# Patient Record
Sex: MALE | Race: WHITE | NOT HISPANIC OR LATINO | Employment: OTHER | URBAN - METROPOLITAN AREA
[De-identification: names, ages, dates, MRNs, and addresses within clinical notes are randomized per-mention and may not be internally consistent; named-entity substitution may affect disease eponyms.]

---

## 2017-01-23 ENCOUNTER — LAB VISIT (OUTPATIENT)
Dept: LAB | Facility: HOSPITAL | Age: 62
End: 2017-01-23
Payer: MEDICARE

## 2017-01-23 DIAGNOSIS — Z76.82 AWAITING ORGAN TRANSPLANT STATUS: ICD-10-CM

## 2017-01-23 PROCEDURE — 86829 HLA CLASS I/II ANTIBODY QUAL: CPT | Mod: 91,PO

## 2017-01-23 PROCEDURE — 86829 HLA CLASS I/II ANTIBODY QUAL: CPT | Mod: PO

## 2017-01-31 LAB — HPRA INTERPRETATION: NORMAL

## 2017-02-20 ENCOUNTER — LAB VISIT (OUTPATIENT)
Dept: LAB | Facility: HOSPITAL | Age: 62
End: 2017-02-20
Payer: MEDICARE

## 2017-02-20 DIAGNOSIS — Z76.82 AWAITING ORGAN TRANSPLANT STATUS: ICD-10-CM

## 2017-02-20 PROCEDURE — 86832 HLA CLASS I HIGH DEFIN QUAL: CPT | Mod: PO

## 2017-02-20 PROCEDURE — 86833 HLA CLASS II HIGH DEFIN QUAL: CPT | Mod: PO

## 2017-03-07 ENCOUNTER — HOSPITAL ENCOUNTER (OUTPATIENT)
Dept: RADIOLOGY | Facility: HOSPITAL | Age: 62
Discharge: HOME | End: 2017-03-07
Attending: INTERNAL MEDICINE | Admitting: NURSE PRACTITIONER
Payer: MEDICARE

## 2017-03-07 ENCOUNTER — HOSPITAL ENCOUNTER (OUTPATIENT)
Dept: RADIOLOGY | Facility: HOSPITAL | Age: 62
Discharge: HOME | End: 2017-03-07
Attending: INTERNAL MEDICINE
Payer: MEDICARE

## 2017-03-07 ENCOUNTER — OFFICE VISIT (OUTPATIENT)
Dept: TRANSPLANT | Facility: CLINIC | Age: 62
End: 2017-03-07
Payer: MEDICARE

## 2017-03-07 ENCOUNTER — CLINICAL SUPPORT (OUTPATIENT)
Dept: CARDIOLOGY | Facility: CLINIC | Age: 62
End: 2017-03-07
Payer: MEDICARE

## 2017-03-07 VITALS
RESPIRATION RATE: 17 BRPM | DIASTOLIC BLOOD PRESSURE: 96 MMHG | BODY MASS INDEX: 31.64 KG/M2 | HEIGHT: 69 IN | WEIGHT: 213.63 LBS | TEMPERATURE: 98 F | HEART RATE: 85 BPM | OXYGEN SATURATION: 95 % | SYSTOLIC BLOOD PRESSURE: 151 MMHG

## 2017-03-07 DIAGNOSIS — Z76.82 ORGAN TRANSPLANT CANDIDATE: ICD-10-CM

## 2017-03-07 DIAGNOSIS — N18.6 ESRD ON HEMODIALYSIS: Chronic | ICD-10-CM

## 2017-03-07 DIAGNOSIS — I25.10 CORONARY ARTERY DISEASE INVOLVING NATIVE CORONARY ARTERY OF NATIVE HEART WITHOUT ANGINA PECTORIS: Primary | Chronic | ICD-10-CM

## 2017-03-07 DIAGNOSIS — N28.1 RENAL CYST: ICD-10-CM

## 2017-03-07 DIAGNOSIS — N02.B9 IGA NEPHROPATHY: Chronic | ICD-10-CM

## 2017-03-07 DIAGNOSIS — Z76.82 AWAITING ORGAN TRANSPLANT STATUS: ICD-10-CM

## 2017-03-07 DIAGNOSIS — Z99.2 ESRD ON HEMODIALYSIS: Chronic | ICD-10-CM

## 2017-03-07 DIAGNOSIS — I15.0 RENOVASCULAR HYPERTENSION: ICD-10-CM

## 2017-03-07 DIAGNOSIS — G62.9 PERIPHERAL POLYNEUROPATHY: Chronic | ICD-10-CM

## 2017-03-07 LAB — DIASTOLIC DYSFUNCTION: NO

## 2017-03-07 PROCEDURE — 76770 US EXAM ABDO BACK WALL COMP: CPT | Mod: 26,GC,, | Performed by: RADIOLOGY

## 2017-03-07 PROCEDURE — 93018 CV STRESS TEST I&R ONLY: CPT | Mod: S$PBB,,, | Performed by: INTERNAL MEDICINE

## 2017-03-07 PROCEDURE — 99213 OFFICE O/P EST LOW 20 MIN: CPT | Mod: PBBFAC | Performed by: NURSE PRACTITIONER

## 2017-03-07 PROCEDURE — 93016 CV STRESS TEST SUPVJ ONLY: CPT | Mod: S$PBB,,, | Performed by: INTERNAL MEDICINE

## 2017-03-07 PROCEDURE — 71020 XR CHEST PA AND LATERAL: CPT | Mod: 26,,, | Performed by: RADIOLOGY

## 2017-03-07 PROCEDURE — 99999 PR PBB SHADOW E&M-EST. PATIENT-LVL III: CPT | Mod: PBBFAC,,, | Performed by: NURSE PRACTITIONER

## 2017-03-07 PROCEDURE — 78492 MYOCRD IMG PET MLT RST&STRS: CPT | Mod: 26,S$PBB,, | Performed by: INTERNAL MEDICINE

## 2017-03-07 PROCEDURE — 72170 X-RAY EXAM OF PELVIS: CPT | Mod: 26,,, | Performed by: RADIOLOGY

## 2017-03-07 PROCEDURE — 99215 OFFICE O/P EST HI 40 MIN: CPT | Mod: S$PBB,,, | Performed by: NURSE PRACTITIONER

## 2017-03-07 RX ORDER — SODIUM BICARBONATE 325 MG/1
100 TABLET ORAL DAILY
COMMUNITY

## 2017-03-07 RX ORDER — SEVELAMER CARBONATE 800 MG/1
1600 TABLET, FILM COATED ORAL
COMMUNITY

## 2017-03-07 RX ORDER — NAPROXEN SODIUM 220 MG/1
81 TABLET, FILM COATED ORAL DAILY
COMMUNITY

## 2017-03-07 RX ORDER — ATORVASTATIN CALCIUM 20 MG/1
20 TABLET, FILM COATED ORAL DAILY
COMMUNITY
End: 2019-02-05

## 2017-03-07 RX ORDER — METOPROLOL SUCCINATE 25 MG/1
25 TABLET, EXTENDED RELEASE ORAL DAILY
COMMUNITY

## 2017-03-07 RX ORDER — GABAPENTIN 250 MG/5ML
600 SOLUTION ORAL 3 TIMES DAILY
COMMUNITY
End: 2021-01-01 | Stop reason: SDUPTHER

## 2017-03-07 RX ORDER — PANTOPRAZOLE SODIUM 20 MG/1
40 TABLET, DELAYED RELEASE ORAL DAILY
COMMUNITY

## 2017-03-07 NOTE — MR AVS SNAPSHOT
[facility]  [address]  [facility] [address]  Phone: [phone]                  Lele Austin   3/7/2017 12:30 PM   Office Visit    Description:  Male : 1955   Provider:  KIDNEY LISTED, TRANSPLANT   Department:  [facility]           Reason for Visit     Waitlist Maintenance           Diagnoses this Visit        Comments    Coronary artery disease involving native coronary artery of native heart without angina pectoris    -  Primary     Organ transplant candidate         IgA nephropathy         Renovascular hypertension         Renal cyst         ESRD on hemodialysis         Peripheral polyneuropathy                To Do List           Goals (5 Years of Data)     None      Follow-Up and Disposition     Return in about 1 year (around 3/7/2018).      [facility] On [facility]     [facility] On Call Nurse Care Line -  Assistance  Registered nurses in the [facility] On Call Center provide clinical advisement, health education, appointment booking, and other advisory services.  Call for this free service at [phone].             Medications           Message regarding Medications     Verify the changes and/or additions to your medication regime listed below are the same as discussed with your clinician today.  If any of these changes or additions are incorrect, please notify your healthcare provider.             Verify that the below list of medications is an accurate representation of the medications you are currently taking.  If none reported, the list may be blank. If incorrect, please contact your healthcare provider. Carry this list with you in case of emergency.           Current Medications     aspirin 81 MG Chew Take 81 mg by mouth once daily.    atorvastatin (LIPITOR) 20 MG tablet Take 20 mg by mouth once daily.    FOLIC ACID/VIT BCOMP,C (RENETTA-PATRICK ORAL) Take by mouth Daily.    gabapentin (NEURONTIN) 250 mg/5 mL solution Take 250 mg by mouth 2 (two) times daily.     "metoprolol succinate (TOPROL-XL) 25 MG 24 hr tablet Take 25 mg by mouth once daily.    pantoprazole (PROTONIX) 20 MG tablet Take 20 mg by mouth once daily.    sevelamer carbonate (RENVELA) 800 mg Tab Take 800 mg by mouth 3 (three) times daily with meals.    sodium bicarbonate 325 MG tablet Take 325 mg by mouth 2 (two) times daily.           Clinical Reference Information           Your Vitals Were     BP Pulse Temp Resp Height Weight    151/96 (BP Location: Right arm, Patient Position: Sitting, BP Method: Automatic) 85 97.9 °F (36.6 °C) (Oral) 17 5' 9" (1.753 m) 96.9 kg (213 lb 10 oz)    SpO2 BMI             95% 31.55 kg/m2         Blood Pressure          Most Recent Value    BP  (!)  151/96      Allergies as of 3/7/2017     Penicillins      Immunizations Administered on Date of Encounter - 3/7/2017     None      Maintenance Dialysis History     Start End Type Comments Center    2/25/2014  [facility]            Current [facility] Information     [facility] [address] Phone #:  [phone]    Contact:  [facility]  [address] Fax #:  [phone]            Transplant Information        Txp Date Organ Coordinator Care Team     Kidney Ricardo Casarez Jr., RN Referring Physician:  Suresh Garrido MD   Current Nephrologist:  Aram Phan MD         [facility] Sign-Up     Activating your [facility] account is as easy as 1-2-3!     1) Visit [email or web address], select Sign Up Now, enter this activation code and your date of birth, then select Next.  Activation code not generated  Current Patient Portal Status: Account disabled      2) Create a username and password to use when you visit [facility] in the future and select a security question in case you lose your password and select Next.    3) Enter your e-mail address and click Sign Up!    Additional Information  If you have questions, please e-mail [email or web address] or call [phone] to talk to our [facility] staff. Remember, [facility] is " NOT to be used for urgent needs. For medical emergencies, dial 911.         Smoking Cessation     If you would like to quit smoking:   You may be eligible for free services if you are a [place] resident and started smoking cigarettes before September 1, 1988.  Call the Smoking Cessation [employer] (SCT) toll free at (274) 033-2748 or (211) 997-3388.   Call 1-800-QUIT-NOW if you do not meet the above criteria.            Language Assistance Services     ATTENTION: Language assistance services are available, free of charge. Please call [phone].      ATENCIÓN: Si habla español, tiene a kiser disposición servicios gratuitos de asistencia lingüística. Llame al [phone].     JENNIFER Ý: N?u b?n nói Ti?ng Vi?t, có các d?ch v? h? tr? ngôn ng? mi?n phí dành cho b?n. G?i s? [phone].         [facility] complies with applicable Federal civil rights laws and does not discriminate on the basis of race, color, national origin, age, disability, or sex.

## 2017-03-07 NOTE — LETTER
March 7, 2017        Aram Phan  [address] [place] [address]  Phone: [phone]  Fax: [phone]             [facility]  [address]  [facility] [address]  Phone: [phone]   Patient: Lele Austin   MR Number: [id number]   YOB: 1955   Date of Visit: 3/7/2017       Dear Dr. Aram Phan    Thank you for referring Lele Austin to me for evaluation. Attached you will find relevant portions of my assessment and plan of care.    If you have questions, please do not hesitate to call me. I look forward to following Lele Austin along with you.    Sincerely,    Jess Perez, NP    Enclosure    If you would like to receive this communication electronically, please contact [email or web address] or [phone] to request [employer] access.    [employer] is a tool which provides read-only access to select patient information with whom you have a relationship. Its easy to use and provides real time access to review your patients record including encounter summaries, notes, results, and demographic information.    If you feel you have received this communication in error or would no longer like to receive these types of communications, please e-mail [email or web address]
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
## 2017-03-17 LAB — HPRA INTERPRETATION: NORMAL

## 2017-03-30 LAB
CLASS I ANTIBODIES - LUMINEX: NORMAL
CLASS I ANTIBODY COMMENTS - LUMINEX: NORMAL
CLASS II ANTIBODIES - LUMINEX: NEGATIVE
CPRA %: 16
SERUM COLLECTION DT - LUMINEX CLASS I: NORMAL
SERUM COLLECTION DT - LUMINEX CLASS II: NORMAL
SPCL1 TESTING DATE: NORMAL
SPCL2 TESTING DATE: NORMAL

## 2017-07-13 ENCOUNTER — LAB VISIT (OUTPATIENT)
Dept: LAB | Facility: HOSPITAL | Age: 62
End: 2017-07-13
Payer: MEDICARE

## 2017-07-13 DIAGNOSIS — Z76.82 AWAITING ORGAN TRANSPLANT STATUS: ICD-10-CM

## 2017-07-13 DIAGNOSIS — Z76.82 ORGAN TRANSPLANT CANDIDATE: ICD-10-CM

## 2017-07-13 PROCEDURE — 86829 HLA CLASS I/II ANTIBODY QUAL: CPT | Mod: PO,TXP

## 2017-07-13 PROCEDURE — 86829 HLA CLASS I/II ANTIBODY QUAL: CPT | Mod: 91,PO,TXP

## 2017-08-01 DIAGNOSIS — Z76.82 ORGAN TRANSPLANT CANDIDATE: ICD-10-CM

## 2017-08-08 LAB — HPRA INTERPRETATION: NORMAL

## 2017-08-17 ENCOUNTER — LAB VISIT (OUTPATIENT)
Dept: LAB | Facility: HOSPITAL | Age: 62
End: 2017-08-17
Payer: MEDICARE

## 2017-08-17 DIAGNOSIS — Z76.82 AWAITING ORGAN TRANSPLANT STATUS: ICD-10-CM

## 2017-08-17 PROCEDURE — 86829 HLA CLASS I/II ANTIBODY QUAL: CPT | Mod: PO,TXP

## 2017-08-17 PROCEDURE — 86829 HLA CLASS I/II ANTIBODY QUAL: CPT | Mod: 91,PO,TXP

## 2017-09-11 LAB — HPRA INTERPRETATION: NORMAL

## 2017-09-14 ENCOUNTER — LAB VISIT (OUTPATIENT)
Dept: LAB | Facility: HOSPITAL | Age: 62
End: 2017-09-14
Payer: MEDICARE

## 2017-09-14 DIAGNOSIS — Z76.82 AWAITING ORGAN TRANSPLANT STATUS: ICD-10-CM

## 2017-09-14 PROCEDURE — 86833 HLA CLASS II HIGH DEFIN QUAL: CPT | Mod: PO,TXP

## 2017-09-14 PROCEDURE — 86832 HLA CLASS I HIGH DEFIN QUAL: CPT | Mod: PO,TXP

## 2017-09-29 LAB — HPRA INTERPRETATION: NORMAL

## 2017-10-05 LAB
CLASS I ANTIBODIES - LUMINEX: NORMAL
CLASS II ANTIBODIES - LUMINEX: NEGATIVE
CPRA %: 32
SERUM COLLECTION DT - LUMINEX CLASS I: NORMAL
SERUM COLLECTION DT - LUMINEX CLASS II: NORMAL
SPCL1 TESTING DATE: NORMAL
SPCL2 TESTING DATE: NORMAL
SPCLU TESTING DATE: NORMAL

## 2017-12-15 ENCOUNTER — LAB VISIT (OUTPATIENT)
Dept: LAB | Facility: HOSPITAL | Age: 62
End: 2017-12-15
Payer: MEDICARE

## 2017-12-15 DIAGNOSIS — Z76.82 AWAITING ORGAN TRANSPLANT STATUS: ICD-10-CM

## 2017-12-15 PROCEDURE — 86833 HLA CLASS II HIGH DEFIN QUAL: CPT | Mod: PO,TXP

## 2017-12-15 PROCEDURE — 86832 HLA CLASS I HIGH DEFIN QUAL: CPT | Mod: PO,TXP

## 2017-12-27 DIAGNOSIS — Z76.82 ORGAN TRANSPLANT CANDIDATE: Primary | ICD-10-CM

## 2017-12-27 LAB — HPRA INTERPRETATION: NORMAL

## 2017-12-27 NOTE — PROGRESS NOTES
YEARLY LIST MANAGEMENT NOTE    Lele Austin's kidney transplant listing status reviewed.  Patient is due for follow-up appointments in February 2018.  Appointments will be scheduled per protocol.  HLA sample is current and being rec'd on a regular basis.

## 2018-01-04 LAB
CLASS I ANTIBODIES - LUMINEX: NORMAL
CLASS II ANTIBODIES - LUMINEX: NEGATIVE
CPRA %: 31
SERUM COLLECTION DT - LUMINEX CLASS I: NORMAL
SERUM COLLECTION DT - LUMINEX CLASS II: NORMAL
SPCL1 TESTING DATE: NORMAL
SPCL2 TESTING DATE: NORMAL
SPCLU TESTING DATE: NORMAL

## 2018-03-15 ENCOUNTER — HOSPITAL ENCOUNTER (OUTPATIENT)
Dept: RADIOLOGY | Facility: HOSPITAL | Age: 63
Discharge: HOME | End: 2018-03-15
Attending: INTERNAL MEDICINE
Payer: MEDICARE

## 2018-03-15 ENCOUNTER — HOSPITAL ENCOUNTER (OUTPATIENT)
Dept: CARDIOLOGY | Facility: CLINIC | Age: 63
Discharge: HOME | End: 2018-03-15
Attending: INTERNAL MEDICINE
Payer: MEDICARE

## 2018-03-15 ENCOUNTER — OFFICE VISIT (OUTPATIENT)
Dept: TRANSPLANT | Facility: CLINIC | Age: 63
End: 2018-03-15
Payer: MEDICARE

## 2018-03-15 VITALS
SYSTOLIC BLOOD PRESSURE: 171 MMHG | HEART RATE: 84 BPM | BODY MASS INDEX: 32.65 KG/M2 | HEIGHT: 69 IN | WEIGHT: 220.44 LBS | OXYGEN SATURATION: 97 % | DIASTOLIC BLOOD PRESSURE: 102 MMHG | RESPIRATION RATE: 16 BRPM | TEMPERATURE: 98 F

## 2018-03-15 DIAGNOSIS — Z76.82 ORGAN TRANSPLANT CANDIDATE: ICD-10-CM

## 2018-03-15 DIAGNOSIS — Z99.2 ESRD ON HEMODIALYSIS: Primary | Chronic | ICD-10-CM

## 2018-03-15 DIAGNOSIS — Z76.82 AWAITING ORGAN TRANSPLANT STATUS: ICD-10-CM

## 2018-03-15 DIAGNOSIS — I25.10 CORONARY ARTERY DISEASE INVOLVING NATIVE CORONARY ARTERY, ANGINA PRESENCE UNSPECIFIED, UNSPECIFIED WHETHER NATIVE OR TRANSPLANTED HEART: Chronic | ICD-10-CM

## 2018-03-15 DIAGNOSIS — N02.B9 IGA NEPHROPATHY: Chronic | ICD-10-CM

## 2018-03-15 DIAGNOSIS — N18.6 ESRD ON HEMODIALYSIS: Primary | Chronic | ICD-10-CM

## 2018-03-15 DIAGNOSIS — I10 ESSENTIAL HYPERTENSION: ICD-10-CM

## 2018-03-15 LAB
AORTIC VALVE STENOSIS: ABNORMAL
DIASTOLIC DYSFUNCTION: YES
ESTIMATED PA SYSTOLIC PRESSURE: 23.43
RETIRED EF AND QEF - SEE NOTES: 55 (ref 55–65)
TRICUSPID VALVE REGURGITATION: ABNORMAL

## 2018-03-15 PROCEDURE — 71046 X-RAY EXAM CHEST 2 VIEWS: CPT | Mod: TC,TXP

## 2018-03-15 PROCEDURE — 93306 TTE W/DOPPLER COMPLETE: CPT | Mod: PBBFAC,TXP | Performed by: INTERNAL MEDICINE

## 2018-03-15 PROCEDURE — 76770 US EXAM ABDO BACK WALL COMP: CPT | Mod: TC,TXP

## 2018-03-15 PROCEDURE — 71046 X-RAY EXAM CHEST 2 VIEWS: CPT | Mod: 26,TXP,, | Performed by: RADIOLOGY

## 2018-03-15 PROCEDURE — 99999 PR PBB SHADOW E&M-EST. PATIENT-LVL III: CPT | Mod: PBBFAC,TXP,, | Performed by: INTERNAL MEDICINE

## 2018-03-15 PROCEDURE — 99213 OFFICE O/P EST LOW 20 MIN: CPT | Mod: PBBFAC,25,TXP | Performed by: INTERNAL MEDICINE

## 2018-03-15 PROCEDURE — 99215 OFFICE O/P EST HI 40 MIN: CPT | Mod: S$PBB,TXP,, | Performed by: INTERNAL MEDICINE

## 2018-03-15 PROCEDURE — 76770 US EXAM ABDO BACK WALL COMP: CPT | Mod: 26,TXP,, | Performed by: RADIOLOGY

## 2018-03-15 RX ORDER — LISINOPRIL 5 MG/1
5 TABLET ORAL EVERY OTHER DAY
COMMUNITY

## 2018-03-15 NOTE — PROGRESS NOTES
Will send this to the his nephrologist for management of BP and diuresis/UF    Patient needs see cardiology for protocol to follow up aortic stenosis

## 2018-03-15 NOTE — PROGRESS NOTES
PRE-TRANSPLANT NOTE:    This patient's medication therapy was evaluated as part of his pre-transplant evaluation.    The following pharmacologic concerns were noted: Patient is taking low dose aspirin.     Current Outpatient Prescriptions   Medication Sig Dispense Refill    aspirin 81 MG Chew Take 81 mg by mouth once daily.      atorvastatin (LIPITOR) 20 MG tablet Take 20 mg by mouth once daily.      FOLIC ACID/VIT BCOMP,C (RENETTA-PATRICK ORAL) Take by mouth Daily.      gabapentin (NEURONTIN) 250 mg/5 mL solution Take 250 mg by mouth 2 (two) times daily.      metoprolol succinate (TOPROL-XL) 25 MG 24 hr tablet Take 25 mg by mouth once daily.      pantoprazole (PROTONIX) 20 MG tablet Take 20 mg by mouth once daily.      sevelamer carbonate (RENVELA) 800 mg Tab Take 800 mg by mouth 3 (three) times daily with meals.      sodium bicarbonate 325 MG tablet Take 325 mg by mouth 2 (two) times daily.       No current facility-administered medications for this visit.        I am available for consultation and can be contacted, as needed by the other members of the Kidney Transplant team.
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
## 2018-03-15 NOTE — PATIENT INSTRUCTIONS
1. Try to exercise more  2. Talk to your PCP about the swallowing issues   3. Have any potential donors contact the living donor coordinator   4. Talk to your kidney doctor about removing more fluid with dialysis

## 2018-03-15 NOTE — LETTER
March 15, 2018        Aram Phan  [address] [place] [address]  Phone: [phone]  Fax: [phone]             [facility]  [address]  [facility] [address]  Phone: [phone]   Patient: Lele Austin   MR Number: [id number]   YOB: 1955   Date of Visit: 3/15/2018       Dear Dr. Aram Phan    Thank you for referring Lele Austin to me for evaluation. Attached you will find relevant portions of my assessment and plan of care.    If you have questions, please do not hesitate to call me. I look forward to following Lele Austin along with you.    Sincerely,    Cleo Neal MD    Enclosure    If you would like to receive this communication electronically, please contact [email or web address] or [phone] to request [employer] Link access.    [employer] Link is a tool which provides read-only access to select patient information with whom you have a relationship. Its easy to use and provides real time access to review your patients record including encounter summaries, notes, results, and demographic information.    If you feel you have received this communication in error or would no longer like to receive these types of communications, please e-mail [email or web address]

## 2018-03-15 NOTE — LETTER
Date: 6/1/2018          Listed Patient      To: Dialysis Unit  and Charge RN From: Renu Terry LCSW    RE: Lele Austin, 1955, [id number]     At [facility], we conduct adherence checks as an important part of transplant care. Initial and listed patient assessments are not complete without adherence information.        Please complete the following information:     Current Dry Weight: ___________         Most Recent Pre-Treatment Weight: ___________ /date: _________                    Data from the last 1-3 months:  (data from last 3 months preferred):    Number of AMAs with dates, time, and reasons: ____________________________________________________    ______________________________________________________________________________________________    ______________________________________________________________________________________________    Number of No-Shows with dates and reasons: ______________________________________________________      ______________________________________________________________________________________________    Last intact PTH:  ___________/date: __________      Any concerns with Labs:  YES / NO      If yes, please explain:  ___________________________________________________________________________    ______________________________________________________________________________________________    Any concerns with Caregivers:  YES / NO    If yes, please explain:  ___________________________________________________________________________    ______________________________________________________________________________________________     Any concerns with Transportation:  YES / NO    If yes, please explain:  ___________________________________________________________________________    ______________________________________________________________________________________________    Any Psychiatric and/or Psychosocial concerns:   YES / NO     If yes, please explain: ___________________________________________________________________________    ______________________________________________________________________________________________      PLEASE RETURN TO: FAX: [phone]     Thank you for collaborating with us in the care of this patient.           [address]  ?  [place] [address]  ?  phone [phone]  ?  fax [phone]  ?  [email or web address]  Confidentiality notice: The accompanying facsimile is intended solely for the use of the recipient designated above. Document(s) transmitted herewith may contain information that is confidential and privileged. Delivery, distribution or dissemination of this communication other than to the intended recipient is strictly prohibited. If you have received this facsimile in error, please notify us immediately by telephone.

## 2018-03-16 ENCOUNTER — LAB VISIT (OUTPATIENT)
Dept: LAB | Facility: HOSPITAL | Age: 63
End: 2018-03-16
Payer: MEDICARE

## 2018-03-16 DIAGNOSIS — Z76.82 ORGAN TRANSPLANT CANDIDATE: ICD-10-CM

## 2018-03-16 PROCEDURE — 86833 HLA CLASS II HIGH DEFIN QUAL: CPT | Mod: PO,TXP

## 2018-03-16 PROCEDURE — 86832 HLA CLASS I HIGH DEFIN QUAL: CPT | Mod: PO,TXP

## 2018-03-21 LAB — HPRA INTERPRETATION: NORMAL

## 2018-04-02 LAB
CLASS I ANTIBODIES - LUMINEX: NORMAL
CLASS I ANTIBODY COMMENTS - LUMINEX: NORMAL
CLASS II ANTIBODIES - LUMINEX: NEGATIVE
CPRA %: 14
SERUM COLLECTION DT - LUMINEX CLASS I: NORMAL
SERUM COLLECTION DT - LUMINEX CLASS II: NORMAL
SPCL1 TESTING DATE: NORMAL
SPCL2 TESTING DATE: NORMAL
SPCLU TESTING DATE: NORMAL
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
## 2018-06-08 ENCOUNTER — SOCIAL WORK (OUTPATIENT)
Dept: TRANSPLANT | Facility: CLINIC | Age: 63
End: 2018-06-08

## 2018-06-08 NOTE — PROGRESS NOTES
Dialysis Adherence:    SW received a faxed adherence form from 's dialysis center indicates over last three months that the patient has had 1 AMA on 2/19/18, not had any no-shows, and no issues with caregivers, transportation, or any other psychosocial concerns..        Psychosocial Suitability: Patient presents as a suitable candidate for kidney transplant at this time. Based on psychosocial risk factors, patient presents as low risk, due to adeqaute adherence.        Form also indicates:    Last intact PTH from 05/07/2018 is reported as 802.    Current dry weight is reported as 97.0kg and Most Recent Pre-treatment weight is reported as 98.8kg on 06/01/2018.

## 2018-06-19 ENCOUNTER — LAB VISIT (OUTPATIENT)
Dept: LAB | Facility: HOSPITAL | Age: 63
End: 2018-06-19
Payer: MEDICARE

## 2018-06-19 DIAGNOSIS — Z76.82 ORGAN TRANSPLANT CANDIDATE: ICD-10-CM

## 2018-06-19 PROCEDURE — 86832 HLA CLASS I HIGH DEFIN QUAL: CPT | Mod: PO,TXP

## 2018-06-19 PROCEDURE — 86833 HLA CLASS II HIGH DEFIN QUAL: CPT | Mod: PO,TXP

## 2018-06-27 LAB — HPRA INTERPRETATION: NORMAL

## 2018-07-10 LAB
CLASS I ANTIBODIES - LUMINEX: NORMAL
CLASS I ANTIBODY COMMENTS - LUMINEX: NORMAL
CLASS II ANTIBODIES - LUMINEX: NEGATIVE
CPRA %: 0
CPRA %: 16
CPRA %: 16
SERUM COLLECTION DT - LUMINEX CLASS I: NORMAL
SERUM COLLECTION DT - LUMINEX CLASS II: NORMAL
SPCL1 TESTING DATE: NORMAL
SPCL2 TESTING DATE: NORMAL
SPCLU TESTING DATE: NORMAL

## 2018-10-10 DIAGNOSIS — Z76.82 ORGAN TRANSPLANT CANDIDATE: ICD-10-CM

## 2019-01-02 DIAGNOSIS — Z76.82 ORGAN TRANSPLANT CANDIDATE: Primary | ICD-10-CM

## 2019-01-02 NOTE — PROGRESS NOTES
YEARLY LIST MANAGEMENT NOTE    Lele Austin's kidney transplant listing status reviewed.  Patient is due for follow-up appointments in February 2019.  Appointments will be scheduled per protocol.  HLA sample is current and being rec'd on a regular basis.

## 2019-02-05 ENCOUNTER — HOSPITAL ENCOUNTER (OUTPATIENT)
Dept: RADIOLOGY | Facility: HOSPITAL | Age: 64
Discharge: HOME | End: 2019-02-05
Attending: NURSE PRACTITIONER
Payer: MEDICARE

## 2019-02-05 ENCOUNTER — OFFICE VISIT (OUTPATIENT)
Dept: TRANSPLANT | Facility: CLINIC | Age: 64
End: 2019-02-05
Payer: MEDICARE

## 2019-02-05 ENCOUNTER — CLINICAL SUPPORT (OUTPATIENT)
Dept: CARDIOLOGY | Facility: CLINIC | Age: 64
End: 2019-02-05
Attending: NURSE PRACTITIONER
Payer: MEDICARE

## 2019-02-05 ENCOUNTER — DOCUMENTATION ONLY (OUTPATIENT)
Dept: TRANSPLANT | Facility: CLINIC | Age: 64
End: 2019-02-05

## 2019-02-05 ENCOUNTER — TELEPHONE (OUTPATIENT)
Dept: TRANSPLANT | Facility: CLINIC | Age: 64
End: 2019-02-05

## 2019-02-05 VITALS
SYSTOLIC BLOOD PRESSURE: 126 MMHG | DIASTOLIC BLOOD PRESSURE: 81 MMHG | TEMPERATURE: 98 F | OXYGEN SATURATION: 97 % | WEIGHT: 224.63 LBS | HEART RATE: 91 BPM | RESPIRATION RATE: 18 BRPM | HEIGHT: 69 IN | BODY MASS INDEX: 33.27 KG/M2

## 2019-02-05 VITALS — DIASTOLIC BLOOD PRESSURE: 77 MMHG | HEART RATE: 88 BPM | SYSTOLIC BLOOD PRESSURE: 164 MMHG

## 2019-02-05 DIAGNOSIS — N18.6 END STAGE RENAL DISEASE: ICD-10-CM

## 2019-02-05 DIAGNOSIS — Z99.2 ESRD ON HEMODIALYSIS: Chronic | ICD-10-CM

## 2019-02-05 DIAGNOSIS — N02.B9 IGA NEPHROPATHY: Primary | Chronic | ICD-10-CM

## 2019-02-05 DIAGNOSIS — Z76.82 ORGAN TRANSPLANT CANDIDATE: ICD-10-CM

## 2019-02-05 DIAGNOSIS — N18.6 ESRD ON HEMODIALYSIS: Chronic | ICD-10-CM

## 2019-02-05 DIAGNOSIS — I25.10 CORONARY ARTERY DISEASE INVOLVING NATIVE CORONARY ARTERY, ANGINA PRESENCE UNSPECIFIED, UNSPECIFIED WHETHER NATIVE OR TRANSPLANTED HEART: Chronic | ICD-10-CM

## 2019-02-05 DIAGNOSIS — N28.1 RENAL CYST: ICD-10-CM

## 2019-02-05 DIAGNOSIS — G62.9 PERIPHERAL POLYNEUROPATHY: Chronic | ICD-10-CM

## 2019-02-05 DIAGNOSIS — Z76.82 PATIENT ON WAITING LIST FOR KIDNEY TRANSPLANT: Chronic | ICD-10-CM

## 2019-02-05 DIAGNOSIS — I10 ESSENTIAL HYPERTENSION: ICD-10-CM

## 2019-02-05 LAB
CV STRESS BASE HR: 77 BPM
DIASTOLIC BLOOD PRESSURE: 81 MMHG
END DIASTOLIC INDEX-HIGH: 170 ML/M2
END SYSTOLIC INDEX-HIGH: 70 ML/M2
NUC REST DIASTOLIC VOLUME INDEX: 84
NUC REST EJECTION FRACTION: 62
NUC REST SYSTOLIC VOLUME INDEX: 32
NUC STRESS DIASTOLIC VOLUME INDEX: 102
NUC STRESS EJECTION FRACTION: 70 %
NUC STRESS SYSTOLIC VOLUME INDEX: 31
OHS CV CPX 85 PERCENT MAX PREDICTED HEART RATE MALE: 133
OHS CV CPX MAX PREDICTED HEART RATE: 156
OHS CV CPX PATIENT IS FEMALE: 0
OHS CV CPX PATIENT IS MALE: 1
OHS CV CPX PEAK DIASTOLIC BLOOD PRESSURE: 85 MMHG
OHS CV CPX PEAK HEAR RATE: 72 BPM
OHS CV CPX PEAK RATE PRESSURE PRODUCT: NORMAL
OHS CV CPX PEAK SYSTOLIC BLOOD PRESSURE: 159 MMHG
OHS CV CPX PERCENT MAX PREDICTED HEART RATE ACHIEVED: 46
OHS CV CPX RATE PRESSURE PRODUCT PRESENTING: NORMAL
RETIRED EF AND QEF - SEE NOTES: 51 %
SYSTOLIC BLOOD PRESSURE: 142 MMHG

## 2019-02-05 PROCEDURE — 99999 PR PBB SHADOW E&M-EST. PATIENT-LVL I: ICD-10-PCS | Mod: PBBFAC,TXP,,

## 2019-02-05 PROCEDURE — 99215 OFFICE O/P EST HI 40 MIN: CPT | Mod: S$PBB,TXP,, | Performed by: NURSE PRACTITIONER

## 2019-02-05 PROCEDURE — 99999 PR PBB SHADOW E&M-EST. PATIENT-LVL IV: ICD-10-PCS | Mod: PBBFAC,TXP,, | Performed by: NURSE PRACTITIONER

## 2019-02-05 PROCEDURE — 99999 PR PBB SHADOW E&M-EST. PATIENT-LVL IV: CPT | Mod: PBBFAC,TXP,, | Performed by: NURSE PRACTITIONER

## 2019-02-05 PROCEDURE — 71046 X-RAY EXAM CHEST 2 VIEWS: CPT | Mod: TC,TXP

## 2019-02-05 PROCEDURE — 99211 OFF/OP EST MAY X REQ PHY/QHP: CPT | Mod: PBBFAC,25,TXP

## 2019-02-05 PROCEDURE — 72170 XR PELVIS ROUTINE AP: ICD-10-PCS | Mod: 26,TXP,, | Performed by: RADIOLOGY

## 2019-02-05 PROCEDURE — 99214 OFFICE O/P EST MOD 30 MIN: CPT | Mod: PBBFAC,25,27,TXP | Performed by: NURSE PRACTITIONER

## 2019-02-05 PROCEDURE — 71046 X-RAY EXAM CHEST 2 VIEWS: CPT | Mod: 26,TXP,, | Performed by: RADIOLOGY

## 2019-02-05 PROCEDURE — 72170 X-RAY EXAM OF PELVIS: CPT | Mod: 26,TXP,, | Performed by: RADIOLOGY

## 2019-02-05 PROCEDURE — 99215 PR OFFICE/OUTPT VISIT, EST, LEVL V, 40-54 MIN: ICD-10-PCS | Mod: S$PBB,TXP,, | Performed by: NURSE PRACTITIONER

## 2019-02-05 PROCEDURE — 99999 PR PBB SHADOW E&M-EST. PATIENT-LVL I: CPT | Mod: PBBFAC,TXP,,

## 2019-02-05 PROCEDURE — 78492 PET STRESS (CUPID ONLY): ICD-10-PCS | Mod: 26,S$PBB,TXP, | Performed by: INTERNAL MEDICINE

## 2019-02-05 PROCEDURE — 71046 XR CHEST PA AND LATERAL: ICD-10-PCS | Mod: 26,TXP,, | Performed by: RADIOLOGY

## 2019-02-05 PROCEDURE — A9555 RB82 RUBIDIUM: HCPCS | Mod: PBBFAC,TXP | Performed by: INTERNAL MEDICINE

## 2019-02-05 PROCEDURE — 72170 X-RAY EXAM OF PELVIS: CPT | Mod: TC,TXP

## 2019-02-05 PROCEDURE — 76770 US EXAM ABDO BACK WALL COMP: CPT | Mod: TC,TXP

## 2019-02-05 PROCEDURE — 78492 MYOCRD IMG PET MLT RST&STRS: CPT | Mod: PBBFAC,TXP | Performed by: INTERNAL MEDICINE

## 2019-02-05 PROCEDURE — 76770 US RETROPERITONEAL COMPLETE: ICD-10-PCS | Mod: 26,TXP,, | Performed by: RADIOLOGY

## 2019-02-05 PROCEDURE — 76770 US EXAM ABDO BACK WALL COMP: CPT | Mod: 26,TXP,, | Performed by: RADIOLOGY

## 2019-02-05 RX ORDER — LACTULOSE 10 G/10G
10 SOLUTION ORAL 3 TIMES DAILY
COMMUNITY

## 2019-02-05 RX ORDER — ATORVASTATIN CALCIUM 20 MG/1
20 TABLET, FILM COATED ORAL DAILY
COMMUNITY

## 2019-02-05 RX ORDER — DIPYRIDAMOLE 5 MG/ML
56 INJECTION INTRAVENOUS
Status: COMPLETED | OUTPATIENT
Start: 2019-02-05 | End: 2019-02-05

## 2019-02-05 RX ADMIN — DIPYRIDAMOLE 56 MG: 5 INJECTION INTRAVENOUS at 09:02

## 2019-02-05 NOTE — NURSING
PHARM.D. PRE-TRANSPLANT NOTE:    This patient's medication therapy was evaluated as part of his pre-transplant evaluation.    The following pharmacologic concerns were noted: aspirin 81 mg    This patient's medication profile was reviewed for contraindications for DAA Hepatitis C therapy:    [x]  No current inducers of CYP 3A4 or PGP  [x]  No amiodarone on this patient's EMR profile in the last 24 months  [x]  No past or current atrial fibrillation on this patient's EMR profile       Current Outpatient Medications   Medication Sig Dispense Refill    aspirin 81 MG Chew Take 81 mg by mouth once daily.      atorvastatin (LIPITOR) 20 MG tablet Take 20 mg by mouth once daily.      FOLIC ACID/VIT BCOMP,C (RENETTA-PATRICK ORAL) Take by mouth Daily.      gabapentin (NEURONTIN) 250 mg/5 mL solution Take 100 mg by mouth 2 (two) times daily.       lisinopril (PRINIVIL,ZESTRIL) 5 MG tablet Take 5 mg by mouth every other day.      metoprolol succinate (TOPROL-XL) 25 MG 24 hr tablet Take 25 mg by mouth once daily.      pantoprazole (PROTONIX) 20 MG tablet Take 40 mg by mouth once daily.       sevelamer carbonate (RENVELA) 800 mg Tab Take 1,600 mg by mouth 3 (three) times daily with meals.       sodium bicarbonate 325 MG tablet Take 100 mg by mouth once daily.        Current Facility-Administered Medications   Medication Dose Route Frequency Provider Last Rate Last Dose    dipyridamole injection 56 mg  56 mg Intravenous 1 time in Clinic/HOD Jess Perez NP           I am available for consultation and can be contacted, as needed by the other members of the Kidney Transplant team.

## 2019-02-05 NOTE — LETTER
February 7, 2019        Aram Phan  [address] [place] [address]  Phone: [phone]  Fax: [phone]             [facility]  [address]  [facility] [address]  Phone: [phone]   Patient: Lele Austin   MR Number: [id number]   YOB: 1955   Date of Visit: 2/5/2019       Dear Dr. Aram Phan    Thank you for referring Lele Austin to me for evaluation. Attached you will find relevant portions of my assessment and plan of care.    If you have questions, please do not hesitate to call me. I look forward to following Lele Austin along with you.    Sincerely,    Jess Perez, NP    Enclosure    If you would like to receive this communication electronically, please contact [email or web address] or [phone] to request [employer] Link access.    [employer] Link is a tool which provides read-only access to select patient information with whom you have a relationship. Its easy to use and provides real time access to review your patients record including encounter summaries, notes, results, and demographic information.    If you feel you have received this communication in error or would no longer like to receive these types of communications, please e-mail [email or web address]
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
## 2019-02-05 NOTE — TELEPHONE ENCOUNTER
Dialysis Adherence:    Jyothi nurse at 's dialysis unit reports over the last three months that patient has had 0 AMAs, 0 no shows and no issues with labs, transportation or caregiver support. Nurse had no concerns or questions.     [facility] remains available at [phone].

## 2019-02-07 ENCOUNTER — SOCIAL WORK (OUTPATIENT)
Dept: TRANSPLANT | Facility: CLINIC | Age: 64
End: 2019-02-07

## 2019-02-07 NOTE — PROGRESS NOTES
YEARLY PATIENT EDUCATION NOTE    Patient was seen in pre-kidney transplant clinic for yearly (or six months) evaluation for kidney, kidney/pancreas or pancreas only transplant.  The patient attended a group education session that discussed/reviewed the following aspects of transplantation: evaluation and selection committee process, UNOS waitlist management/multiple listings, types of organs offered (KDPI < 85%, KDPI > 85%, PHS increased risk, DCD, HCV+), financial aspects, surgical procedures, dietary instruction pre- and post-transplant, health maintenance pre- and post-transplant, post-transplant hospitalization and outpatient follow-up, potential to participate in a research protocol, and medication management and side effects.  A question and answer session was provided after the presentation.    The patient was seen by the following members of the multi-disciplinary team to include: Nephrologist/PA, , , Pharmacist and Dietician (if applicable).    The patient reviewed and signed all consents for evaluation which were witnessed and sent to scanning into the EPIC chart.    The patient was given an education book and plan for further evaluation based on his individual assessment.      The patient was encouraged to call with any questions or concerns.
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
## 2019-02-08 ENCOUNTER — SOCIAL WORK (OUTPATIENT)
Dept: TRANSPLANT | Facility: CLINIC | Age: 64
End: 2019-02-08

## 2019-02-08 NOTE — PROGRESS NOTES
Caregiver Plan Update    EFRAIN spoke with pt's sister-in-law Cassie and she confirmed that she couldn't be the caregiver because she has anxiety and has to stay home and pay the bills for the pt and her sister. Cassie reports Mya, pt's galoe, is trying to find a niece that would be able to assist as primary caregiver with her.     EFRAIN tried to call pt at [phone] but the number was disconnected. EFRAIN called Mya to confirm plan but Mya didn't answer so left voicemail.    EFRAIN remains available at [phone].

## 2019-02-12 ENCOUNTER — TELEPHONE (OUTPATIENT)
Dept: TRANSPLANT | Facility: CLINIC | Age: 64
End: 2019-02-12

## 2019-02-12 NOTE — TELEPHONE ENCOUNTER
"Caregiver Update     SWI returned pt's call this morning. Pt report still not having a caregiver plan because pt's niece has "the mind of a child" even though pt's sister-in-law suggested her. Pt reports his mother would be able to help but pt reports she doesn't get along with his fiancee. Pt reports working on figuring out the details ASAP and calling back as soon as the plan is finalized.    Pt confirmed his cell is [phone].    SWI remains available at [phone].   "

## 2019-02-14 ENCOUNTER — TELEPHONE (OUTPATIENT)
Dept: TRANSPLANT | Facility: HOSPITAL | Age: 64
End: 2019-02-14

## 2019-02-14 ENCOUNTER — TELEPHONE (OUTPATIENT)
Dept: TRANSPLANT | Facility: CLINIC | Age: 64
End: 2019-02-14

## 2019-02-14 NOTE — TELEPHONE ENCOUNTER
Caregiver Update    EFRAIN spoke with pt's niece, Sindy Hoffman, and confirmed she would assist in the role as primary caregiver with pt's fiancee Mya Samueler. Sindy reports she is able to stay at the  apartments as long as necessary if pt is transplanted. Sindy denied any minors in the home and reports being able to assist with helping Mya get around, as necessary, and also assist with caring for pt. Sindy reports not driving because she doesn't have a 's license.     Name: Sindy Hoffman  Phone: [phone]  Age: 31  City: [place] State: [place]  Relationship: Niece   Does this person drive? No     Mya, pt and pt's step-son report Mya is able to drive.     EFRAIN later spoke with Sindy to confirm employment and why she doesn't have a drivers license. Sindy and her mother report Sindy being mentally disabled since she was 8 y.o. Sindy reports being unemployed because of her mental disability and reports never learning to drive.     EFRAIN spoke with pt and notified him that he would need a different caregiver. Pt reports working on finalizing caregiver plan.     EFRAIN notified Ricardo. EFRAIN remains available at [phone].

## 2019-02-14 NOTE — TELEPHONE ENCOUNTER
I have reviewed the notes, assessments, interventions, and/or recommendations by EFRAIN Samuel, and I concur with her documentation.      MONISHA MooreW

## 2019-02-14 NOTE — TELEPHONE ENCOUNTER
Caregiver Update    SWI returned pt's call. Pt reports using his niece, Sindy Hoffman as a primary caregiver with his fianum Roque. SWI tried to call Sindy at [phone] to confirm the role and education but she wasn't available so SWI left voice message.     SWI remains available at [phone].

## 2019-02-28 ENCOUNTER — TELEPHONE (OUTPATIENT)
Dept: TRANSPLANT | Facility: CLINIC | Age: 64
End: 2019-02-28

## 2019-02-28 NOTE — TELEPHONE ENCOUNTER
Caregiver Plan     SWI called pt to see if there was an updated caregiver plan but pt didn't answer so left voicemail.     SWI remains available at [phone].

## 2019-03-13 ENCOUNTER — TELEPHONE (OUTPATIENT)
Dept: TRANSPLANT | Facility: CLINIC | Age: 64
End: 2019-03-13

## 2019-03-13 NOTE — TELEPHONE ENCOUNTER
"Transplant SW follow up:    SW received message from Transplant Coordinator Wally stating patient's primary caregiver for transplant (pts ceci of 15 yrs) has possible mobility issues which may inhibit caregiver duties.    SW contacted pt by phone, spoke with pt and with pts permission spoke with pts ceci (Mya Terry) - both at pts home this date.     Ceci Terry states she and her adult niece to serve as pts caregiver team for transplant and recovery period. Ceci nobles has pain in legs and feet which is currently managed with medications, and ceci uses wheelchair for longer distances to hold on to for balance only. Sheryl has "snap, crackle and pop in knees but am ok if balanced by holding something." Sheryl is able to push wheelchair with pt in it if needed, and will have ceci's adult niece also assisting as needed (see important information, below). Ceci nobles has an ultrasound appt for herself on 3/18 and will keep pt and transplant team updated on any changes in ability to serve as primary caregiver (with niece assisting).     Pts Ceci's Niece is Sindy Tolbert, 31yo - does not drive, attended special education through 12th grade, is able to assist Mya (pts ceci) by providing moral support, and is able follow Mya's instructions to provide physical assistance to pt if needed, including helping pt in and out of pts truck as needed. Ceci states niece Sindy Hoffman will not serve as primary caregiver, only will assist ceci to take care of patient.    SW educated pt and fiancee about importance of timely communication of concerns and needs, including if pts caregiver plan changes at any point, including if called in for a transplant. Pt states clear understanding and agreement to alert transplant team of any changes including with caregiver plan. Fiancee also states understanding and agreement to call with changes.SW provided and discussed resources for pts " "fiancee to access including for DME if needed. Educated again on caregiver responsibilities for transplant patients, ceci states understanding and commitment to caregiver duties.      Additional Notes, per ceci's report:  -Ceci's son Wayne Snider has "social anxiety," is not able to serve as pts caregiver and plans to stay at pts home due to pt and fiancee concern over things being stolen from home by neighbors ("living in neighborhood with addicts who will steal if no one is home).    -Ceci's sister to stay in [place], [place] to help manage pts financial responsibilities such as bills and rent..    -Pts brother stays home with pts mother who is suffering with "lung problems" and is not able to serve as pts caregiver.    -Transportation:   There is one truck shared among the family - ceci plans to drive truck to [facility] if pt is called in for transplant, and will utilize truck to transport pt to and from local housing - states plans to use [facility] apt if available when pt is discharged from hospital. Otherwise plans to stay with pt in hospital room and is aware of limited accommodation in room for pts ceci and henry. Fiancee and pt state it is "ok" and they will "make do." Educated pts ceci on supplies needed for apt /[facility].    Suitability for Transplant:   Based on above updated caregiver information, pt remains a suitable transplant candidate. If caregivers (ceci and nibertram) are not available at time of transplant, pts transplant status and/or candidacy may change - education provided to pt today and pt states clear understanding that transplant status and/or candidacy may change.     Pt and fiancee state neither have additional questions, concerns or needs at this time and will call transplant team (number provided) with any future questions, concerns or needs. SW remains available.     Transplant Coordinator informed via Epic.    "

## 2019-03-14 NOTE — PROGRESS NOTES
Patient case reviewed with Dr Storey and Marietta Kunz, , today.  Patient caregiver situation now stable and patient may safely proceed to transplant.  Patient now okay to remove from Internal Hold.

## 2019-12-20 DIAGNOSIS — Z76.82 ORGAN TRANSPLANT CANDIDATE: Primary | ICD-10-CM

## 2019-12-20 NOTE — PROGRESS NOTES
YEARLY LIST MANAGEMENT NOTE    Lele Austin's kidney transplant listing status reviewed.  Patient is due for follow-up appointments in February 2020.  Appointments will be scheduled per protocol.  HLA sample is current and being rec'd on a regular basis.

## 2019-12-26 ENCOUNTER — TELEPHONE (OUTPATIENT)
Dept: TRANSPLANT | Facility: CLINIC | Age: 64
End: 2019-12-26

## 2020-01-01 ENCOUNTER — LAB VISIT (OUTPATIENT)
Dept: URGENT CARE | Facility: CLINIC | Age: 65
End: 2020-01-01
Payer: MEDICARE

## 2020-01-01 ENCOUNTER — HOSPITAL ENCOUNTER (OUTPATIENT)
Dept: RADIOLOGY | Facility: HOSPITAL | Age: 65
Discharge: HOME | End: 2020-09-29
Attending: INTERNAL MEDICINE
Payer: MEDICARE

## 2020-01-01 ENCOUNTER — TELEPHONE (OUTPATIENT)
Dept: PULMONOLOGY | Facility: CLINIC | Age: 65
End: 2020-01-01

## 2020-01-01 ENCOUNTER — TELEPHONE (OUTPATIENT)
Dept: TRANSPLANT | Facility: CLINIC | Age: 65
End: 2020-01-01

## 2020-01-01 ENCOUNTER — OFFICE VISIT (OUTPATIENT)
Dept: PULMONOLOGY | Facility: CLINIC | Age: 65
End: 2020-01-01
Payer: MEDICARE

## 2020-01-01 ENCOUNTER — CLINICAL SUPPORT (OUTPATIENT)
Dept: PULMONOLOGY | Facility: CLINIC | Age: 65
End: 2020-01-01
Payer: MEDICARE

## 2020-01-01 ENCOUNTER — TELEPHONE (OUTPATIENT)
Dept: ENDOSCOPY | Facility: HOSPITAL | Age: 65
End: 2020-01-01

## 2020-01-01 VITALS
OXYGEN SATURATION: 98 % | HEART RATE: 99 BPM | TEMPERATURE: 98 F | DIASTOLIC BLOOD PRESSURE: 78 MMHG | BODY MASS INDEX: 35.02 KG/M2 | HEIGHT: 68 IN | SYSTOLIC BLOOD PRESSURE: 118 MMHG | RESPIRATION RATE: 17 BRPM | WEIGHT: 231.06 LBS

## 2020-01-01 VITALS
RESPIRATION RATE: 18 BRPM | HEART RATE: 107 BPM | SYSTOLIC BLOOD PRESSURE: 138 MMHG | TEMPERATURE: 98 F | DIASTOLIC BLOOD PRESSURE: 84 MMHG | HEART RATE: 85 BPM | BODY MASS INDEX: 36.07 KG/M2 | WEIGHT: 238 LBS | OXYGEN SATURATION: 96 % | HEIGHT: 68 IN

## 2020-01-01 DIAGNOSIS — R05.3 CHRONIC COUGH: ICD-10-CM

## 2020-01-01 DIAGNOSIS — R91.1 LUNG NODULE: ICD-10-CM

## 2020-01-01 DIAGNOSIS — R05.9 COUGH: ICD-10-CM

## 2020-01-01 DIAGNOSIS — Z76.82 ORGAN TRANSPLANT CANDIDATE: Primary | ICD-10-CM

## 2020-01-01 DIAGNOSIS — R94.2 DECREASED DIFFUSION CAPACITY: ICD-10-CM

## 2020-01-01 DIAGNOSIS — R91.1 LUNG NODULE: Primary | ICD-10-CM

## 2020-01-01 DIAGNOSIS — I70.0 AORTIC ATHEROSCLEROSIS: ICD-10-CM

## 2020-01-01 DIAGNOSIS — R91.8 ABNORMAL CT SCAN OF LUNG: ICD-10-CM

## 2020-01-01 DIAGNOSIS — J44.9 CHRONIC OBSTRUCTIVE PULMONARY DISEASE, UNSPECIFIED COPD TYPE: Primary | ICD-10-CM

## 2020-01-01 DIAGNOSIS — Z87.891 FORMER SMOKER: ICD-10-CM

## 2020-01-01 DIAGNOSIS — R05.3 CHRONIC COUGH: Primary | ICD-10-CM

## 2020-01-01 DIAGNOSIS — J92.0 CALCIFIED PLEURAL PLAQUE DUE TO ASBESTOS EXPOSURE: ICD-10-CM

## 2020-01-01 DIAGNOSIS — I35.9 AORTIC VALVE DISORDER: ICD-10-CM

## 2020-01-01 DIAGNOSIS — I51.9 DIASTOLIC DYSFUNCTION, LEFT VENTRICLE: ICD-10-CM

## 2020-01-01 DIAGNOSIS — R06.02 SOBOE (SHORTNESS OF BREATH ON EXERTION): ICD-10-CM

## 2020-01-01 DIAGNOSIS — J98.4 SMALL AIRWAYS DISEASE: ICD-10-CM

## 2020-01-01 LAB
BRPFT: ABNORMAL
DLCO ADJ PRE: 16.94 ML/(MIN*MMHG) (ref 19.45–33.31)
DLCO SINGLE BREATH LLN: 19.45
DLCO SINGLE BREATH PRE REF: 64.2 %
DLCO SINGLE BREATH REF: 26.38
DLCOC SBVA LLN: 2.68
DLCOC SBVA PRE REF: 89.7 %
DLCOC SBVA REF: 3.91
DLCOC SINGLE BREATH LLN: 19.45
DLCOC SINGLE BREATH PRE REF: 64.2 %
DLCOC SINGLE BREATH REF: 26.38
DLCOVA LLN: 2.68
DLCOVA PRE REF: 89.7 %
DLCOVA PRE: 3.51 ML/(MIN*MMHG*L) (ref 2.68–5.14)
DLCOVA REF: 3.91
DLVAADJ PRE: 3.51 ML/(MIN*MMHG*L) (ref 2.68–5.14)
ERV LLN: -16448.94
ERV PRE REF: 43.5 %
ERV REF: 1.06
FEF 25 75 LLN: 1.15
FEF 25 75 PRE REF: 39.8 %
FEF 25 75 REF: 2.52
FEV1 FVC LLN: 64
FEV1 FVC PRE REF: 88.2 %
FEV1 FVC REF: 77
FEV1 LLN: 2.32
FEV1 PRE REF: 62 %
FEV1 REF: 3.16
FRCPLETH LLN: 2.57
FRCPLETH PREREF: 77.1 %
FRCPLETH REF: 3.55
FVC LLN: 3.09
FVC PRE REF: 70.1 %
FVC REF: 4.12
IVC PRE: 2.66 L (ref 3.09–5.14)
IVC SINGLE BREATH LLN: 3.09
IVC SINGLE BREATH PRE REF: 64.6 %
IVC SINGLE BREATH REF: 4.12
MVV LLN: 103
MVV PRE REF: 75.8 %
MVV REF: 121
PEF LLN: 6.12
PEF PRE REF: 69.6 %
PEF REF: 8.32
PRE DLCO: 16.94 ML/(MIN*MMHG) (ref 19.45–33.31)
PRE ERV: 0.46 L (ref -16448.94–16451.06)
PRE FEF 25 75: 1 L/S (ref 1.15–3.89)
PRE FET 100: 6.68 SEC
PRE FEV1 FVC: 67.81 % (ref 63.96–89.78)
PRE FEV1: 1.96 L (ref 2.32–3.99)
PRE FRC PL: 2.74 L
PRE FVC: 2.89 L (ref 3.09–5.14)
PRE MVV: 92 L/MIN (ref 103.19–139.61)
PRE PEF: 5.79 L/S (ref 6.12–10.51)
PRE RV: 2.16 L (ref 1.81–3.16)
PRE TLC: 6.22 L (ref 5.59–7.89)
RAW LLN: 3.06
RAW PRE REF: 100.2 %
RAW PRE: 3.06 CMH2O*S/L (ref 3.06–3.06)
RAW REF: 3.06
RV LLN: 1.81
RV PRE REF: 86.8 %
RV REF: 2.49
RVTLC LLN: 31
RVTLC PRE REF: 87.5 %
RVTLC PRE: 34.73 % (ref 30.72–48.68)
RVTLC REF: 40
SARS-COV-2 RNA RESP QL NAA+PROBE: NOT DETECTED
TLC LLN: 5.59
TLC PRE REF: 92.2 %
TLC REF: 6.74
VA PRE: 4.84 L (ref 6.59–6.59)
VA SINGLE BREATH LLN: 6.59
VA SINGLE BREATH PRE REF: 73.5 %
VA SINGLE BREATH REF: 6.59
VC LLN: 3.09
VC PRE REF: 98.7 %
VC PRE: 4.06 L (ref 3.09–5.14)
VC REF: 4.12
VTGRAWPRE: 3.72 L

## 2020-01-01 PROCEDURE — 99205 OFFICE O/P NEW HI 60 MIN: CPT | Mod: S$PBB,TXP,, | Performed by: INTERNAL MEDICINE

## 2020-01-01 PROCEDURE — 94010 BREATHING CAPACITY TEST: ICD-10-PCS | Mod: 26,S$PBB,NTX, | Performed by: INTERNAL MEDICINE

## 2020-01-01 PROCEDURE — 94726 PLETHYSMOGRAPHY LUNG VOLUMES: CPT | Mod: PBBFAC,NTX

## 2020-01-01 PROCEDURE — 71250 CT THORAX DX C-: CPT | Mod: 26,NTX,, | Performed by: RADIOLOGY

## 2020-01-01 PROCEDURE — 94726 PULM FUNCT TST PLETHYSMOGRAP: ICD-10-PCS | Mod: 26,S$PBB,NTX, | Performed by: INTERNAL MEDICINE

## 2020-01-01 PROCEDURE — 94729 DIFFUSING CAPACITY: CPT | Mod: 26,S$PBB,NTX, | Performed by: INTERNAL MEDICINE

## 2020-01-01 PROCEDURE — 99999 PR PBB SHADOW E&M-EST. PATIENT-LVL IV: ICD-10-PCS | Mod: PBBFAC,TXP,, | Performed by: INTERNAL MEDICINE

## 2020-01-01 PROCEDURE — 99213 OFFICE O/P EST LOW 20 MIN: CPT | Mod: PBBFAC,TXP | Performed by: INTERNAL MEDICINE

## 2020-01-01 PROCEDURE — 99214 OFFICE O/P EST MOD 30 MIN: CPT | Mod: S$PBB,NTX,, | Performed by: INTERNAL MEDICINE

## 2020-01-01 PROCEDURE — 99999 PR PBB SHADOW E&M-EST. PATIENT-LVL IV: CPT | Mod: PBBFAC,TXP,, | Performed by: INTERNAL MEDICINE

## 2020-01-01 PROCEDURE — 94010 BREATHING CAPACITY TEST: CPT | Mod: 26,S$PBB,NTX, | Performed by: INTERNAL MEDICINE

## 2020-01-01 PROCEDURE — 94729 DIFFUSING CAPACITY: CPT | Mod: PBBFAC,NTX

## 2020-01-01 PROCEDURE — 99999 PR PBB SHADOW E&M-EST. PATIENT-LVL III: ICD-10-PCS | Mod: PBBFAC,TXP,, | Performed by: INTERNAL MEDICINE

## 2020-01-01 PROCEDURE — 99214 OFFICE O/P EST MOD 30 MIN: CPT | Mod: PBBFAC,TXP | Performed by: INTERNAL MEDICINE

## 2020-01-01 PROCEDURE — 94729 PR C02/MEMBANE DIFFUSE CAPACITY: ICD-10-PCS | Mod: 26,S$PBB,NTX, | Performed by: INTERNAL MEDICINE

## 2020-01-01 PROCEDURE — 71250 CT THORAX DX C-: CPT | Mod: TC,NTX

## 2020-01-01 PROCEDURE — 71250 CT CHEST WITHOUT CONTRAST: ICD-10-PCS | Mod: 26,NTX,, | Performed by: RADIOLOGY

## 2020-01-01 PROCEDURE — U0003 INFECTIOUS AGENT DETECTION BY NUCLEIC ACID (DNA OR RNA); SEVERE ACUTE RESPIRATORY SYNDROME CORONAVIRUS 2 (SARS-COV-2) (CORONAVIRUS DISEASE [COVID-19]), AMPLIFIED PROBE TECHNIQUE, MAKING USE OF HIGH THROUGHPUT TECHNOLOGIES AS DESCRIBED BY CMS-2020-01-R: HCPCS | Mod: TXP

## 2020-01-01 PROCEDURE — 94010 BREATHING CAPACITY TEST: CPT | Mod: PBBFAC,NTX

## 2020-01-01 PROCEDURE — 99205 PR OFFICE/OUTPT VISIT, NEW, LEVL V, 60-74 MIN: ICD-10-PCS | Mod: S$PBB,TXP,, | Performed by: INTERNAL MEDICINE

## 2020-01-01 PROCEDURE — 99214 PR OFFICE/OUTPT VISIT, EST, LEVL IV, 30-39 MIN: ICD-10-PCS | Mod: S$PBB,NTX,, | Performed by: INTERNAL MEDICINE

## 2020-01-01 PROCEDURE — 94726 PLETHYSMOGRAPHY LUNG VOLUMES: CPT | Mod: 26,S$PBB,NTX, | Performed by: INTERNAL MEDICINE

## 2020-01-01 PROCEDURE — 99999 PR PBB SHADOW E&M-EST. PATIENT-LVL III: CPT | Mod: PBBFAC,TXP,, | Performed by: INTERNAL MEDICINE

## 2020-01-01 RX ORDER — LEVOFLOXACIN 250 MG/1
TABLET ORAL
COMMUNITY
End: 2021-01-01

## 2020-01-01 RX ORDER — GABAPENTIN 600 MG/1
TABLET ORAL
COMMUNITY
Start: 2020-01-01

## 2020-01-01 RX ORDER — CHLORHEXIDINE GLUCONATE ORAL RINSE 1.2 MG/ML
SOLUTION DENTAL
COMMUNITY
End: 2021-01-01

## 2020-01-01 RX ORDER — HYDROCODONE BITARTRATE AND ACETAMINOPHEN 7.5; 325 MG/1; MG/1
TABLET ORAL
COMMUNITY
End: 2021-01-01

## 2020-01-01 RX ORDER — UMECLIDINIUM BROMIDE AND VILANTEROL TRIFENATATE 62.5; 25 UG/1; UG/1
1 POWDER RESPIRATORY (INHALATION) DAILY
Qty: 1 EACH | Refills: 5 | Status: SHIPPED | OUTPATIENT
Start: 2020-01-01

## 2020-01-01 RX ORDER — ALBUTEROL SULFATE 90 UG/1
2 AEROSOL, METERED RESPIRATORY (INHALATION) EVERY 6 HOURS PRN
Qty: 18 G | Refills: 5 | Status: SHIPPED | OUTPATIENT
Start: 2020-01-01 | End: 2021-09-15

## 2020-01-01 RX ORDER — CYCLOBENZAPRINE HCL 10 MG
TABLET ORAL
COMMUNITY
Start: 2020-01-01

## 2020-02-27 ENCOUNTER — OFFICE VISIT (OUTPATIENT)
Dept: TRANSPLANT | Facility: CLINIC | Age: 65
End: 2020-02-27
Payer: MEDICARE

## 2020-02-27 ENCOUNTER — HOSPITAL ENCOUNTER (OUTPATIENT)
Dept: RADIOLOGY | Facility: HOSPITAL | Age: 65
Discharge: HOME | End: 2020-02-27
Attending: NURSE PRACTITIONER
Payer: MEDICARE

## 2020-02-27 ENCOUNTER — HOSPITAL ENCOUNTER (OUTPATIENT)
Dept: CARDIOLOGY | Facility: CLINIC | Age: 65
Discharge: HOME | End: 2020-02-27
Attending: NURSE PRACTITIONER
Payer: MEDICARE

## 2020-02-27 VITALS
DIASTOLIC BLOOD PRESSURE: 80 MMHG | BODY MASS INDEX: 33.33 KG/M2 | WEIGHT: 225 LBS | HEIGHT: 69 IN | SYSTOLIC BLOOD PRESSURE: 140 MMHG | HEART RATE: 70 BPM

## 2020-02-27 VITALS
TEMPERATURE: 99 F | HEIGHT: 68 IN | RESPIRATION RATE: 18 BRPM | HEART RATE: 80 BPM | DIASTOLIC BLOOD PRESSURE: 92 MMHG | OXYGEN SATURATION: 97 % | BODY MASS INDEX: 34.01 KG/M2 | SYSTOLIC BLOOD PRESSURE: 138 MMHG | WEIGHT: 224.44 LBS

## 2020-02-27 DIAGNOSIS — Z76.82 ORGAN TRANSPLANT CANDIDATE: ICD-10-CM

## 2020-02-27 DIAGNOSIS — I15.0 RENOVASCULAR HYPERTENSION: ICD-10-CM

## 2020-02-27 DIAGNOSIS — Z76.82 PATIENT ON WAITING LIST FOR KIDNEY TRANSPLANT: Primary | Chronic | ICD-10-CM

## 2020-02-27 DIAGNOSIS — N18.6 ESRD ON HEMODIALYSIS: Chronic | ICD-10-CM

## 2020-02-27 DIAGNOSIS — N02.B9 IGA NEPHROPATHY: Chronic | ICD-10-CM

## 2020-02-27 DIAGNOSIS — I25.10 CORONARY ARTERY DISEASE INVOLVING NATIVE CORONARY ARTERY OF NATIVE HEART WITHOUT ANGINA PECTORIS: Chronic | ICD-10-CM

## 2020-02-27 DIAGNOSIS — Z99.2 ESRD ON HEMODIALYSIS: Chronic | ICD-10-CM

## 2020-02-27 LAB
ASCENDING AORTA: 3.66 CM
AV INDEX (PROSTH): 0.41
AV MEAN GRADIENT: 23 MMHG
AV PEAK GRADIENT: 37 MMHG
AV VALVE AREA: 1.81 CM2
AV VELOCITY RATIO: 0.39
BSA FOR ECHO PROCEDURE: 2.23 M2
CV ECHO LV RWT: 0.35 CM
DOP CALC AO PEAK VEL: 3.05 M/S
DOP CALC AO VTI: 65.25 CM
DOP CALC LVOT AREA: 4.4 CM2
DOP CALC LVOT DIAMETER: 2.36 CM
DOP CALC LVOT PEAK VEL: 1.2 M/S
DOP CALC LVOT STROKE VOLUME: 118.05 CM3
DOP CALCLVOT PEAK VEL VTI: 27 CM
E WAVE DECELERATION TIME: 217.48 MSEC
E/A RATIO: 0.79
E/E' RATIO: 15.33 M/S
ECHO LV POSTERIOR WALL: 0.97 CM (ref 0.6–1.1)
FRACTIONAL SHORTENING: 36 % (ref 28–44)
INTERVENTRICULAR SEPTUM: 1.28 CM (ref 0.6–1.1)
LA MAJOR: 5.2 CM
LA MINOR: 4.91 CM
LA WIDTH: 3.61 CM
LEFT ATRIUM SIZE: 4.08 CM
LEFT ATRIUM VOLUME INDEX: 29.1 ML/M2
LEFT ATRIUM VOLUME: 63.23 CM3
LEFT INTERNAL DIMENSION IN SYSTOLE: 3.54 CM (ref 2.1–4)
LEFT VENTRICLE DIASTOLIC VOLUME INDEX: 68.99 ML/M2
LEFT VENTRICLE DIASTOLIC VOLUME: 149.83 ML
LEFT VENTRICLE MASS INDEX: 116 G/M2
LEFT VENTRICLE SYSTOLIC VOLUME INDEX: 24 ML/M2
LEFT VENTRICLE SYSTOLIC VOLUME: 52.22 ML
LEFT VENTRICULAR INTERNAL DIMENSION IN DIASTOLE: 5.54 CM (ref 3.5–6)
LEFT VENTRICULAR MASS: 252.44 G
LV LATERAL E/E' RATIO: 18.4 M/S
LV SEPTAL E/E' RATIO: 13.14 M/S
MV PEAK A VEL: 1.17 M/S
MV PEAK E VEL: 0.92 M/S
PISA TR MAX VEL: 2.61 M/S
PULM VEIN S/D RATIO: 1.33
PV PEAK D VEL: 0.33 M/S
PV PEAK S VEL: 0.44 M/S
RA MAJOR: 4.72 CM
RA PRESSURE: 3 MMHG
RA WIDTH: 3.58 CM
RIGHT VENTRICULAR END-DIASTOLIC DIMENSION: 3.62 CM
RV TISSUE DOPPLER FREE WALL SYSTOLIC VELOCITY 1 (APICAL 4 CHAMBER VIEW): 12.65 CM/S
SINUS: 3.07 CM
STJ: 3.27 CM
TDI LATERAL: 0.05 M/S
TDI SEPTAL: 0.07 M/S
TDI: 0.06 M/S
TR MAX PG: 27 MMHG
TRICUSPID ANNULAR PLANE SYSTOLIC EXCURSION: 2.2 CM
TV REST PULMONARY ARTERY PRESSURE: 30 MMHG

## 2020-02-27 PROCEDURE — 99215 PR OFFICE/OUTPT VISIT, EST, LEVL V, 40-54 MIN: ICD-10-PCS | Mod: S$PBB,TXP,, | Performed by: NURSE PRACTITIONER

## 2020-02-27 PROCEDURE — 93978 VASCULAR STUDY: CPT | Mod: TC,TXP

## 2020-02-27 PROCEDURE — 93978 VASCULAR STUDY: CPT | Mod: 26,TXP,, | Performed by: RADIOLOGY

## 2020-02-27 PROCEDURE — 93306 ECHO (CUPID ONLY): ICD-10-PCS | Mod: 26,S$PBB,TXP, | Performed by: INTERNAL MEDICINE

## 2020-02-27 PROCEDURE — 71046 X-RAY EXAM CHEST 2 VIEWS: CPT | Mod: TC,TXP

## 2020-02-27 PROCEDURE — 99999 PR PBB SHADOW E&M-EST. PATIENT-LVL IV: ICD-10-PCS | Mod: PBBFAC,TXP,, | Performed by: NURSE PRACTITIONER

## 2020-02-27 PROCEDURE — 76770 US RETROPERITONEAL COMPLETE: ICD-10-PCS | Mod: 26,TXP,, | Performed by: RADIOLOGY

## 2020-02-27 PROCEDURE — 93306 TTE W/DOPPLER COMPLETE: CPT | Mod: PBBFAC,TXP | Performed by: INTERNAL MEDICINE

## 2020-02-27 PROCEDURE — 71046 X-RAY EXAM CHEST 2 VIEWS: CPT | Mod: 26,TXP,, | Performed by: SPECIALIST

## 2020-02-27 PROCEDURE — 99215 OFFICE O/P EST HI 40 MIN: CPT | Mod: S$PBB,TXP,, | Performed by: NURSE PRACTITIONER

## 2020-02-27 PROCEDURE — 99999 PR PBB SHADOW E&M-EST. PATIENT-LVL IV: CPT | Mod: PBBFAC,TXP,, | Performed by: NURSE PRACTITIONER

## 2020-02-27 PROCEDURE — 76770 US EXAM ABDO BACK WALL COMP: CPT | Mod: TC,TXP

## 2020-02-27 PROCEDURE — 99214 OFFICE O/P EST MOD 30 MIN: CPT | Mod: PBBFAC,25,TXP | Performed by: NURSE PRACTITIONER

## 2020-02-27 PROCEDURE — 71046 XR CHEST PA AND LATERAL: ICD-10-PCS | Mod: 26,TXP,, | Performed by: SPECIALIST

## 2020-02-27 PROCEDURE — 76770 US EXAM ABDO BACK WALL COMP: CPT | Mod: 26,TXP,, | Performed by: RADIOLOGY

## 2020-02-27 PROCEDURE — 93978 US DOPP ILIACS BILATERAL: ICD-10-PCS | Mod: 26,TXP,, | Performed by: RADIOLOGY
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
## 2020-02-27 NOTE — LETTER
March 2, 2020        Aram Phan  [address] [place] [address]  Phone: [phone]  Fax: [phone]             [facility]  [address]  [facility] [address]  Phone: [phone]   Patient: Lele Austin   MR Number: [id number]   YOB: 1955   Date of Visit: 2/27/2020       Dear Dr. Aram Phan    Thank you for referring Lele Austin to me for evaluation. Attached you will find relevant portions of my assessment and plan of care.    If you have questions, please do not hesitate to call me. I look forward to following Lele Austin along with you.    Sincerely,    Jess Perez, NP    Enclosure    If you would like to receive this communication electronically, please contact [email or web address] or [phone] to request [facility] access.    [facility] is a tool which provides read-only access to select patient information with whom you have a relationship. Its easy to use and provides real time access to review your patients record including encounter summaries, notes, results, and demographic information.    If you feel you have received this communication in error or would no longer like to receive these types of communications, please e-mail [email or web address]
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
## 2020-03-02 DIAGNOSIS — R93.89 ABNORMAL FINDINGS ON DIAGNOSTIC IMAGING OF OTHER SPECIFIED BODY STRUCTURES: ICD-10-CM

## 2020-03-04 ENCOUNTER — SOCIAL WORK (OUTPATIENT)
Dept: TRANSPLANT | Facility: CLINIC | Age: 65
End: 2020-03-04

## 2020-03-04 NOTE — PROGRESS NOTES
This patient seen by Renu BEARDENW 3-2-2020 for organ transplant psychosocial update.    [facility]  [place], [phone]  Hemodialysis:  MWF    2- dialysis compliance update shows patient having high dialysis compliance with treatment and  instructions within last 3 months:  Current Dry Weight:  101.5 kg    Most recent pre treatment weight 103.1 kg  2-  AMA    0  No shows   0  Last intact PTH  303  2-3-2020    No concerns with labs, caregivers, transportation, or mental health concerns.    Naomy Mixon MSW LCSW

## 2020-03-17 ENCOUNTER — HOSPITAL ENCOUNTER (OUTPATIENT)
Dept: RADIOLOGY | Facility: HOSPITAL | Age: 65
Discharge: HOME | End: 2020-03-17
Attending: NURSE PRACTITIONER
Payer: MEDICARE

## 2020-03-17 DIAGNOSIS — R93.89 ABNORMAL FINDINGS ON DIAGNOSTIC IMAGING OF OTHER SPECIFIED BODY STRUCTURES: ICD-10-CM

## 2020-03-17 PROCEDURE — 71250 CT THORAX DX C-: CPT | Mod: TC,TXP

## 2020-03-17 PROCEDURE — 71250 CT THORAX DX C-: CPT | Mod: 26,TXP,, | Performed by: RADIOLOGY

## 2020-03-17 PROCEDURE — 71250 CT CHEST WITHOUT CONTRAST: ICD-10-PCS | Mod: 26,TXP,, | Performed by: RADIOLOGY

## 2020-07-21 NOTE — LETTER
July 21, 2020      Hayder Pereira MD  [address]  [facility] [address]           [facility]  [address] [facility]  [place] [address]  Phone: [phone]  Fax: [phone]          Patient: Lele Austin   MR Number: [id number]   YOB: 1955   Date of Visit: 7/21/2020       Dear Dr. Hayder Pereira:    Thank you for referring Lele Austin to me for evaluation. Attached you will find relevant portions of my assessment and plan of care.    If you have questions, please do not hesitate to call me. I look forward to following Lele Austin along with you.    Sincerely,    Lavelle Cuevas MD    Enclosure  CC:  No Recipients    If you would like to receive this communication electronically, please contact [email or web address] or [phone] to request more information on [employer] Link access.    For providers and/or their staff who would like to refer a patient to [facility], please contact us through our one-stop-shop provider referral line, [facility], at [phone].    If you feel you have received this communication in error or would no longer like to receive these types of communications, please e-mail [email or web address]
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
## 2020-09-15 NOTE — PROGRESS NOTES
"                                       Outpatient Pulmonary follow-up      SUBJECTIVE:     Chief Complaint   Patient presents with    Cough    Shortness of Breath       History of Present Illness:    Patient is a 65 y.o. male initially referred 07/21/2020 for evaluation of chronic cough with greenish sputum production since he quit smoking 2014.  Complains of shortness of breath on exertion.    Does not use inhalers.    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    On hemodialysis for few years.    Denies personal history of cancer.      Eighty pack year smoking history quit 2014.    Review of Systems   Constitutional: Positive for weight gain, fatigue and weakness. Negative for fever and chills.   HENT: Negative for nosebleeds.    Eyes: Negative for redness.   Respiratory: Positive for cough, sputum production, shortness of breath and dyspnea on extertion. Negative for choking.    Cardiovascular: Negative for chest pain.   Genitourinary: Negative for hematuria.        End-stage renal disease on dialysis   Endocrine: Negative for cold intolerance.    Musculoskeletal: Positive for arthralgias.   Gastrointestinal: Negative for vomiting.   Neurological: Negative for syncope.   Hematological: Negative for adenopathy.   Psychiatric/Behavioral: Negative for confusion.         OBJECTIVE:     Vital Signs (Most Recent)  Vital Signs  Pulse: 85  Resp: 18  SpO2: 96 %  BP: 138/84  Height and Weight  Height: 5' 8.03" (172.8 cm)  Weight: 108 kg (238 lb)  BSA (Calculated - sq m): 2.28 sq meters  BMI (Calculated): 36.2  Weight in (lb) to have BMI = 25: 164.2]  Wt Readings from Last 2 Encounters:   09/15/20 108 kg (238 lb)   07/21/20 104.8 kg (231 lb 0.7 oz)         Physical Exam:  Physical Exam   Constitutional: He is oriented to person, place, and time. He appears well-developed and well-nourished. He is obese.   HENT:   Head: Normocephalic.   Neck: Neck supple.   Cardiovascular: Normal rate, regular rhythm and normal " heart sounds.   Pulmonary/Chest: Normal expansion and effort normal. No stridor. No respiratory distress. He has decreased breath sounds. He has wheezes. He exhibits no tenderness.   Few scattered wheezes   Abdominal: Soft.   Musculoskeletal:         General: Deformity present. No tenderness.      Comments: Left upper extremity AV fistula   Lymphadenopathy:     He has no cervical adenopathy.   Neurological: He is alert and oriented to person, place, and time. Gait normal.   Skin: Skin is warm. Nails show no clubbing.   Psychiatric: He has a normal mood and affect. His behavior is normal. Judgment and thought content normal.   Nursing note and vitals reviewed.      Laboratory  Lab Results   Component Value Date    WBC 8.99 05/08/2014    RBC 4.49 (L) 05/08/2014    HGB 13.3 (L) 05/08/2014    HCT 41.7 05/08/2014    MCV 93 05/08/2014    MCH 29.6 05/08/2014    MCHC 31.9 (L) 05/08/2014    RDW 13.6 05/08/2014     05/08/2014    MPV 10.6 05/08/2014    GRAN 5.1 05/08/2014    GRAN 57.2 05/08/2014    LYMPH 2.7 05/08/2014    LYMPH 30.0 05/08/2014    MONO 1.0 05/08/2014    MONO 10.6 05/08/2014    EOS 0.2 05/08/2014    BASO 0.03 05/08/2014    EOSINOPHIL 1.7 05/08/2014    BASOPHIL 0.3 05/08/2014       BMP  Lab Results   Component Value Date     05/08/2014    K 3.6 05/08/2014    CL 98 05/08/2014    CO2 28 05/08/2014    BUN 35 (H) 05/08/2014    CREATININE 7.4 (H) 05/08/2014    CALCIUM 9.9 05/08/2014    ANIONGAP 15 05/08/2014    ESTGFRAFRICA 8.4 (A) 05/08/2014    EGFRNONAA 7.3 (A) 05/08/2014    AST 33 05/08/2014    ALT 32 05/08/2014    PROT 7.8 05/08/2014       No results found for: BNP    No results found for: TSH    No results found for: SEDRATE    No results found for: CRP    No results found for: IGE    No results found for: ASPERGILLUS  No results found for: AFUMIGATUSCL     No results found for: ACE    Diagnostic Results:  I have personally reviewed today the following studies :    CT scan of the chest March  2020    FINDINGS:  There is air bronchogram containing consolidation or atelectasis in the right middle lobe which was also present on the prior chest x-ray.  This also was present on a chest x-ray from 03/15/2018 and is also unchanged since that time.     Scarring or atelectasis at the lung bases.  No pneumothorax or pleural effusion.  There is a pulmonary nodule in the left upper lung adjacent to the descending thoracic aorta.  This measures on the order of 6.8 mm.  Calcifications along the posterior pleural surfaces are noted.     Heart size is normal.  Small anterior pericardial effusion is noted.  Thoracic aortic and coronary artery calcifications are present.  Thoracic aorta is mildly dilated and measures up to 4.1 cm maximally.  Trachea and mainstem bronchi are patent.  Thyroid gland is within normal limits.  Multiple shotty axillary and mediastinal lymph nodes.  No pathologically enlarged nodes are seen.     Limited imaging through the upper abdomen demonstrates cortical atrophy with hypoattenuating structures in the right kidney.  The larger of these have the appearance of cysts.  Others are too small to definitively characterize.  Aortic atherosclerosis is noted.  Bones remain intact.  Schmorl's nodes are noted within the lower thoracic spine with vertebral body height loss, likely chronic in etiology.      PFT 09/15/2020    Obstructive pattern noted.  Small airways flow obstruction.  DLCO decreased to 64%.      2D echo February 2020    Mild eccentric left ventricular hypertrophy.   Normal left ventricular systolic function. The estimated ejection fraction is 55%.   Grade II (moderate) left ventricular diastolic dysfunction consistent with pseudonormalization.   Mild left ventricular enlargement.   Mild-to-moderate aortic valve stenosis.   Aortic valve area is 1.81 cm2; peak velocity is 3.05 m/s; mean gradient is 23 mmHg.   Mild tricuspid regurgitation.   Normal right ventricular systolic function.   Mild  right atrial enlargement.   Mild left atrial enlargement.   Normal central venous pressure (3 mmHg).   The estimated PA systolic pressure is 30 mmHg.      ASSESSMENT/PLAN:     Chronic obstructive pulmonary disease, unspecified COPD type  -     albuterol (PROVENTIL/VENTOLIN HFA) 90 mcg/actuation inhaler; Inhale 2 puffs into the lungs every 6 (six) hours as needed for Wheezing. Rescue  Dispense: 18 g; Refill: 5  -     umeclidinium-vilanteroL (ANORO ELLIPTA) 62.5-25 mcg/actuation DsDv; Inhale 1 puff into the lungs once daily. 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    Lung nodule 6.9 mm close to the descending aorta, CT chest to be scheduled ASAP ordered last visit.  Next    Persistent consolidation on the right middle lobe which could be infectious less likely malignant since it is stable on x-ray since 2018.  Cough productive of greenish sputum    Patient forgot sputum samples in his truck.  Advised him to bring sputum sample for microbiological studies.    Start albuterol p.r.n. and Anoro 1 puff daily.        Encouraged patient to continue smoking cessation.    Continue aspirin statin and blood pressure control.    Patient states he is up-to-date on influenza pneumococcal vaccines with hemodialysis center.    Follow up in about 6 months (around 3/15/2021).    This note was prepared using voice recognition system and is likely to have sound alike errors that may have been overlooked even after proof reading.  Please call me with any questions    Discussed diagnosis, its evaluation, treatment and usual course. All questions answered.    Thank you for the courtesy of participating in the care of this patient    Lavelle Cuevas MD

## 2020-10-12 NOTE — TELEPHONE ENCOUNTER
Attempted to schedule endoscopy procedure. Patient states he will have procedure performed near home.

## 2020-12-07 NOTE — LETTER
December 8, 2020    Lele EDWARD [address]  [place] [address]    Dear Lele Austin:  MRN: [id number]    The [facility] team reviewed your transplant candidacy.  It is our programs opinion that you are temporarily not a transplant candidate at our facility as of 12/8/2020 because of pulmonary issues.  You will remain listed on the wait list, but will not be able to receive a transplant until this issue is resolved.      Attached is a letter from the United Network for Organ Sharing (UNOS).  It describes the services and information offered to patients by [facility] and the Organ Procurement and Transplant Network.    The [facility] team remains available to answer any questions.  Should you have any questions regarding this decision, please do not hesitate to contact our pre-transplant office.      Sincerely,    Xiomara Muñoz MD  Medical Director, Kidney & Kidney/Pancreas Transplantation    tj/Enclosed    Cc: Dr. Suresh Garrido        [facility]             The Organ Procurement and Transplantation Network   Toll-free patient services line: Your resource for organ transplant information     If you have a question regarding your own medical care, you always should call your transplant hospital first. However, for general organ transplant-related information, you can call the Organ Procurement and Transplantation Network (OPTN) toll-free patient services line at [phone].     Anyone, including potential transplant candidates, candidates, recipients, family members, friends, living donors, and donor family members, can call this number to:     · Talk about organ donation, living donation, the transplant process, the donation process, and transplant policies.   · Get a free patient information kit with helpful booklets, waiting list and transplant information, and a list of all transplant hospitals.   · Ask questions about the OPTN website (https://optn.transplant.hrsa.gov/),  the [employer] for Organ Sharings (UNOS) website (https://unos.org/), or the UNOS website for living donors and transplant recipients. (https://www.transplantliving.org/).   · Learn how the OPTN can help you.   · Talk about any concerns that you may have with a transplant hospital.     The nations transplant system, the OPTN, is managed under federal contract by the [employer] for Organ Sharing (UNOS), which is a non-profit charitable organization. The OPTN helps create and define organ sharing policies that make the best use of donated organs. This process continuously evaluating new advances and discoveries so policies can be adapted to best serve patients waiting for transplants. To do so, the OPTN works closely with transplant professionals, transplant patients, transplant candidates, donor families, living donors, and the public. All transplant programs and organ procurement organizations throughout the country are OPTN members and are obligated to follow the policies the OPTN creates for allocating organs.     The OPTN also is responsible for:   · Providing educational material for patients, the public, and professionals.   · Raising awareness of the need for donated organs and tissue.   · Coordinating organ procurement, matching, and placement.   · Collecting information about every organ transplant and donation that occurs in the [place].     Remember, you should contact your transplant hospital directly if you have questions or concerns about your own medical care including medical records, work-up progress, and test results.     We are not your transplant hospital, and our staff will not be able to answer questions about your case, so please keep your transplant hospitals phone number handy.   However, while you research your transplant needs and learn as much as you can about transplantation and donation, we welcome your call to our toll-free patient services line at [phone].

## 2020-12-08 NOTE — TELEPHONE ENCOUNTER
"ON-CALL NOTE    UNOS# [id number]    Notified by Gopi Hicks, , that Lele Austin is eligible for kidney offer.  Spoke with patient and identified that patient is currently on antibiotics for possible pneumonia. Patient states he has been taking doxycycline twice daily for a week and still has a cough. He stated he has a follow-up with pulmonologist regarding "spot" on his lung. Reported to Dr. Yoo and patient was determined to not be an appropriate candidate at this time. Patient notified and  verbalized understanding, all questions answered.  Waitlist coordinator notified.    "

## 2020-12-08 NOTE — PROGRESS NOTES
Called pt.  He is still on Abx.  Recent CT scan showed potential infection.  Patient also reports questionable caregiver support at this time.  Case reviewed with Dr. Storey.  Make patient status 7.  Patient informed.  Letter requested.

## 2020-12-08 NOTE — TELEPHONE ENCOUNTER
----- Message from Lavelle Cuevas MD sent at 12/8/2020 12:29 PM CST -----  I will ask my staff to schedule him for a CT scan in  2 weeks from now that will be a 3 months surveillance, the nodule was about 7 mm it was in you and he has 80 pack year smoking history, and enlarging nodule would indicate malignancy.  If stable on the scan  lung nodule can be monitored if growing we might be dealing with a malignancy.    [place] , please change the date of the scan to 2 weeks from now.  Inform patient.  Thank you   ----- Message -----  From: Ricardo Casarez Jr., RN  Sent: 12/8/2020  11:34 AM CST  To: MD Dr. [place] Ponce,  The above pt is currently on the kidney transplant waiting list.  He had an abnormal CT in September of this year.  He is scheduled for a follow up CT in March of 2021.  He, though, is now beginning to get kidney offers.  He has been on dialysis for an extended time.  Is he okay to proceed to kidney transplant before the repeat CT?  If not, can the repeat be performed sooner than March 2021?  Please let me know.  Thanks  Ricardo Casarez Jr., RN, CCTC, [facility]  Senior Kidney Transplant Coordinator  [facility]  [address] [place] [address]  [id number]  Phone [phone]  Fax [phone]

## 2021-01-01 ENCOUNTER — EPISODE CHANGES (OUTPATIENT)
Dept: TRANSPLANT | Facility: CLINIC | Age: 66
End: 2021-01-01

## 2021-01-01 ENCOUNTER — HOSPITAL ENCOUNTER (OUTPATIENT)
Dept: CARDIOLOGY | Facility: HOSPITAL | Age: 66
Discharge: HOME | End: 2021-03-23
Attending: NURSE PRACTITIONER
Payer: MEDICARE

## 2021-01-01 ENCOUNTER — HOSPITAL ENCOUNTER (OUTPATIENT)
Dept: RADIOLOGY | Facility: HOSPITAL | Age: 66
Discharge: HOME | End: 2021-01-19
Attending: INTERNAL MEDICINE
Payer: MEDICARE

## 2021-01-01 ENCOUNTER — ANESTHESIA EVENT (OUTPATIENT)
Dept: ENDOSCOPY | Facility: HOSPITAL | Age: 66
End: 2021-01-01
Payer: MEDICARE

## 2021-01-01 ENCOUNTER — TELEPHONE (OUTPATIENT)
Dept: PULMONOLOGY | Facility: CLINIC | Age: 66
End: 2021-01-01

## 2021-01-01 ENCOUNTER — TELEPHONE (OUTPATIENT)
Dept: CARDIOLOGY | Facility: CLINIC | Age: 66
End: 2021-01-01

## 2021-01-01 ENCOUNTER — TELEPHONE (OUTPATIENT)
Dept: ENDOSCOPY | Facility: HOSPITAL | Age: 66
End: 2021-01-01

## 2021-01-01 ENCOUNTER — OFFICE VISIT (OUTPATIENT)
Dept: TRANSPLANT | Facility: CLINIC | Age: 66
End: 2021-01-01
Payer: MEDICARE

## 2021-01-01 ENCOUNTER — HOSPITAL ENCOUNTER (OUTPATIENT)
Dept: RADIOLOGY | Facility: HOSPITAL | Age: 66
Discharge: HOME | End: 2021-03-23
Attending: NURSE PRACTITIONER
Payer: MEDICARE

## 2021-01-01 ENCOUNTER — HOSPITAL ENCOUNTER (OUTPATIENT)
Facility: HOSPITAL | Age: 66
Discharge: HOME | End: 2021-02-04
Attending: INTERNAL MEDICINE | Admitting: INTERNAL MEDICINE
Payer: MEDICARE

## 2021-01-01 ENCOUNTER — ANESTHESIA (OUTPATIENT)
Dept: ENDOSCOPY | Facility: HOSPITAL | Age: 66
End: 2021-01-01
Payer: MEDICARE

## 2021-01-01 ENCOUNTER — LAB VISIT (OUTPATIENT)
Dept: LAB | Facility: HOSPITAL | Age: 66
End: 2021-01-01
Payer: MEDICARE

## 2021-01-01 ENCOUNTER — OFFICE VISIT (OUTPATIENT)
Dept: PULMONOLOGY | Facility: CLINIC | Age: 66
End: 2021-01-01
Payer: MEDICARE

## 2021-01-01 VITALS
TEMPERATURE: 98 F | SYSTOLIC BLOOD PRESSURE: 98 MMHG | HEART RATE: 80 BPM | RESPIRATION RATE: 18 BRPM | DIASTOLIC BLOOD PRESSURE: 60 MMHG | OXYGEN SATURATION: 95 %

## 2021-01-01 VITALS
DIASTOLIC BLOOD PRESSURE: 85 MMHG | SYSTOLIC BLOOD PRESSURE: 160 MMHG | BODY MASS INDEX: 33.8 KG/M2 | WEIGHT: 223 LBS | HEART RATE: 70 BPM | HEIGHT: 68 IN

## 2021-01-01 VITALS
OXYGEN SATURATION: 97 % | SYSTOLIC BLOOD PRESSURE: 121 MMHG | HEART RATE: 90 BPM | TEMPERATURE: 98 F | DIASTOLIC BLOOD PRESSURE: 80 MMHG | RESPIRATION RATE: 20 BRPM | WEIGHT: 223.31 LBS | HEIGHT: 68 IN | BODY MASS INDEX: 33.84 KG/M2

## 2021-01-01 VITALS
WEIGHT: 221.31 LBS | HEART RATE: 95 BPM | BODY MASS INDEX: 33.54 KG/M2 | OXYGEN SATURATION: 96 % | SYSTOLIC BLOOD PRESSURE: 165 MMHG | DIASTOLIC BLOOD PRESSURE: 100 MMHG | HEIGHT: 68 IN | TEMPERATURE: 98 F | RESPIRATION RATE: 18 BRPM

## 2021-01-01 VITALS — DIASTOLIC BLOOD PRESSURE: 74 MMHG | SYSTOLIC BLOOD PRESSURE: 173 MMHG | HEART RATE: 97 BPM

## 2021-01-01 DIAGNOSIS — R91.8 MULTIPLE LUNG NODULES ON CT: Primary | ICD-10-CM

## 2021-01-01 DIAGNOSIS — R91.8 ABNORMAL CT SCAN OF LUNG: Primary | ICD-10-CM

## 2021-01-01 DIAGNOSIS — R91.1 LUNG NODULE: ICD-10-CM

## 2021-01-01 DIAGNOSIS — I70.0 AORTIC ATHEROSCLEROSIS: ICD-10-CM

## 2021-01-01 DIAGNOSIS — J92.0 CALCIFIED PLEURAL PLAQUE DUE TO ASBESTOS EXPOSURE: ICD-10-CM

## 2021-01-01 DIAGNOSIS — Z76.82 ORGAN TRANSPLANT CANDIDATE: ICD-10-CM

## 2021-01-01 DIAGNOSIS — A31.9 MYCOBACTERIUM INFECTION: ICD-10-CM

## 2021-01-01 DIAGNOSIS — J98.4 SMALL AIRWAYS DISEASE: ICD-10-CM

## 2021-01-01 DIAGNOSIS — Z99.2 ESRD ON HEMODIALYSIS: Chronic | ICD-10-CM

## 2021-01-01 DIAGNOSIS — Z76.82 PATIENT ON WAITING LIST FOR KIDNEY TRANSPLANT: Primary | Chronic | ICD-10-CM

## 2021-01-01 DIAGNOSIS — A31.0 MAI (MYCOBACTERIUM AVIUM-INTRACELLULARE): ICD-10-CM

## 2021-01-01 DIAGNOSIS — J44.9 CHRONIC OBSTRUCTIVE PULMONARY DISEASE, UNSPECIFIED COPD TYPE: ICD-10-CM

## 2021-01-01 DIAGNOSIS — R91.8 ABNORMAL CT SCAN OF LUNG: ICD-10-CM

## 2021-01-01 DIAGNOSIS — I25.10 CORONARY ARTERY DISEASE INVOLVING NATIVE CORONARY ARTERY OF NATIVE HEART WITHOUT ANGINA PECTORIS: Chronic | ICD-10-CM

## 2021-01-01 DIAGNOSIS — R91.8 MULTIPLE LUNG NODULES ON CT: ICD-10-CM

## 2021-01-01 DIAGNOSIS — N18.6 ESRD ON HEMODIALYSIS: Chronic | ICD-10-CM

## 2021-01-01 DIAGNOSIS — N02.B9 IGA NEPHROPATHY: Chronic | ICD-10-CM

## 2021-01-01 DIAGNOSIS — I15.0 RENOVASCULAR HYPERTENSION: ICD-10-CM

## 2021-01-01 LAB
APPEARANCE FLD: CLEAR
APPEARANCE FLD: CLEAR
ASCENDING AORTA: 3.75 CM
AV INDEX (PROSTH): 0.34
AV MEAN GRADIENT: 28 MMHG
AV PEAK GRADIENT: 41 MMHG
AV VALVE AREA: 1.45 CM2
AV VELOCITY RATIO: 0.4
BACTERIA SPEC AEROBE CULT: NORMAL
BODY FLD TYPE: NORMAL
BODY FLD TYPE: NORMAL
BSA FOR ECHO PROCEDURE: 2.2 M2
CFR FLOW - ANTERIOR: 1.34
CFR FLOW - INFERIOR: 1.39
CFR FLOW - LATERAL: 1.27
CFR FLOW - MAX: 1.77
CFR FLOW - MIN: 1
CFR FLOW - SEPTAL: 1.4
CFR FLOW - WHOLE HEART: 1.35
COLOR FLD: COLORLESS
COLOR FLD: COLORLESS
CV ECHO LV RWT: 0.38 CM
CV PHARM DOSE: 56.8 MG
CV STRESS BASE HR: 87 BPM
DIASTOLIC BLOOD PRESSURE: 91 MMHG
DOP CALC AO PEAK VEL: 3.21 M/S
DOP CALC AO VTI: 75.24 CM
DOP CALC LVOT AREA: 4.3 CM2
DOP CALC LVOT DIAMETER: 2.34 CM
DOP CALC LVOT PEAK VEL: 1.27 M/S
DOP CALC LVOT STROKE VOLUME: 109.31 CM3
DOP CALCLVOT PEAK VEL VTI: 25.43 CM
E WAVE DECELERATION TIME: 130.16 MSEC
E/A RATIO: 0.84
E/E' RATIO: 24.44 M/S
ECHO LV POSTERIOR WALL: 1.06 CM (ref 0.6–1.1)
END DIASTOLIC INDEX-HIGH: 170 ML/M2
END SYSTOLIC INDEX-HIGH: 70 ML/M2
FINAL PATHOLOGIC DIAGNOSIS: NORMAL
FRACTIONAL SHORTENING: 30 % (ref 28–44)
FUNGUS SPEC CULT: NORMAL
GRAM STN SPEC: NORMAL
HR MV ECHO: 85 BPM
INTERVENTRICULAR SEPTUM: 1.24 CM (ref 0.6–1.1)
KOH PREP SPEC: NORMAL
LA MAJOR: 5.09 CM
LA MINOR: 4.87 CM
LA WIDTH: 3.47 CM
LEFT ATRIUM SIZE: 4.25 CM
LEFT ATRIUM VOLUME INDEX MOD: 26.6 ML/M2
LEFT ATRIUM VOLUME INDEX: 29.2 ML/M2
LEFT ATRIUM VOLUME MOD: 56.83 CM3
LEFT ATRIUM VOLUME: 62.4 CM3
LEFT INTERNAL DIMENSION IN SYSTOLE: 3.9 CM (ref 2.1–4)
LEFT VENTRICLE DIASTOLIC VOLUME INDEX: 70.41 ML/M2
LEFT VENTRICLE DIASTOLIC VOLUME: 150.67 ML
LEFT VENTRICLE MASS INDEX: 122 G/M2
LEFT VENTRICLE SYSTOLIC VOLUME INDEX: 30.7 ML/M2
LEFT VENTRICLE SYSTOLIC VOLUME: 65.76 ML
LEFT VENTRICULAR INTERNAL DIMENSION IN DIASTOLE: 5.55 CM (ref 3.5–6)
LEFT VENTRICULAR MASS: 260.84 G
LV LATERAL E/E' RATIO: 27.5 M/S
LV SEPTAL E/E' RATIO: 22 M/S
LYMPHOCYTES NFR FLD MANUAL: 1 %
MONOS+MACROS NFR FLD MANUAL: 96 %
MONOS+MACROS NFR FLD MANUAL: 97 %
MV A" WAVE DURATION": 9.71 MSEC
MV MEAN GRADIENT: 5 MMHG
MV PEAK A VEL: 1.31 M/S
MV PEAK E VEL: 1.1 M/S
MV PEAK GRADIENT: 10 MMHG
MV STENOSIS PRESSURE HALF TIME: 37.75 MS
MV VALVE AREA P 1/2 METHOD: 5.83 CM2
NEUTROPHILS NFR FLD MANUAL: 3 %
NEUTROPHILS NFR FLD MANUAL: 3 %
NUC REST DIASTOLIC VOLUME INDEX: 134
NUC REST EJECTION FRACTION: 57
NUC REST SYSTOLIC VOLUME INDEX: 58
NUC STRESS DIASTOLIC VOLUME INDEX: 146
NUC STRESS EJECTION FRACTION: 57 %
NUC STRESS SYSTOLIC VOLUME INDEX: 63
OHS CV CPX 85 PERCENT MAX PREDICTED HEART RATE MALE: 131
OHS CV CPX MAX PREDICTED HEART RATE: 154
OHS CV CPX PATIENT IS FEMALE: 0
OHS CV CPX PATIENT IS MALE: 1
OHS CV CPX PEAK DIASTOLIC BLOOD PRESSURE: 88 MMHG
OHS CV CPX PEAK HEAR RATE: 79 BPM
OHS CV CPX PEAK RATE PRESSURE PRODUCT: NORMAL
OHS CV CPX PEAK SYSTOLIC BLOOD PRESSURE: 191 MMHG
OHS CV CPX PERCENT MAX PREDICTED HEART RATE ACHIEVED: 51
OHS CV CPX RATE PRESSURE PRODUCT PRESENTING: NORMAL
PATH INTERP FLD-IMP: NORMAL
PATH INTERP FLD-IMP: NORMAL
PISA TR MAX VEL: 2.09 M/S
POTASSIUM SERPL-SCNC: 5.2 MMOL/L (ref 3.5–5.1)
PULM VEIN S/D RATIO: 1.23
PV PEAK D VEL: 0.22 M/S
PV PEAK S VEL: 0.27 M/S
RA MAJOR: 4.2 CM
RA PRESSURE: 3 MMHG
RA WIDTH: 3.21 CM
REST FLOW - ANTERIOR: 1.77 CC/MIN/G
REST FLOW - INFERIOR: 1.56 CC/MIN/G
REST FLOW - LATERAL: 1.85 CC/MIN/G
REST FLOW - MAX: 2.36 CC/MIN/G
REST FLOW - MIN: 1.05 CC/MIN/G
REST FLOW - SEPTAL: 1.51 CC/MIN/G
REST FLOW - WHOLE HEART: 1.67 CC/MIN/G
RETIRED EF AND QEF - SEE NOTES: 51 %
RIGHT VENTRICULAR END-DIASTOLIC DIMENSION: 3.72 CM
RV TISSUE DOPPLER FREE WALL SYSTOLIC VELOCITY 1 (APICAL 4 CHAMBER VIEW): 13.8 CM/S
SINUS: 2.91 CM
STJ: 3.15 CM
STRESS FLOW - ANTERIOR: 2.34 CC/MIN/G
STRESS FLOW - INFERIOR: 2.16 CC/MIN/G
STRESS FLOW - LATERAL: 2.34 CC/MIN/G
STRESS FLOW - MAX: 2.8 CC/MIN/G
STRESS FLOW - MIN: 1.36 CC/MIN/G
STRESS FLOW - SEPTAL: 2.1 CC/MIN/G
STRESS FLOW - WHOLE HEART: 2.24 CC/MIN/G
SYSTOLIC BLOOD PRESSURE: 188 MMHG
TDI LATERAL: 0.04 M/S
TDI SEPTAL: 0.05 M/S
TDI: 0.05 M/S
TR MAX PG: 17 MMHG
TRICUSPID ANNULAR PLANE SYSTOLIC EXCURSION: 2.02 CM
TV REST PULMONARY ARTERY PRESSURE: 20 MMHG
WBC # FLD: 17 /CU MM
WBC # FLD: 30 /CU MM

## 2021-01-01 PROCEDURE — 88112 CYTOPATH CELL ENHANCE TECH: CPT | Mod: 26,TXP,, | Performed by: PATHOLOGY

## 2021-01-01 PROCEDURE — 88305 TISSUE EXAM BY PATHOLOGIST: CPT | Mod: NTX | Performed by: PATHOLOGY

## 2021-01-01 PROCEDURE — 87186 SC STD MICRODIL/AGAR DIL: CPT | Performed by: INTERNAL MEDICINE

## 2021-01-01 PROCEDURE — 89051 BODY FLUID CELL COUNT: CPT | Mod: 91,TXP

## 2021-01-01 PROCEDURE — 93018 CV STRESS TEST I&R ONLY: CPT | Mod: TXP,,, | Performed by: INTERNAL MEDICINE

## 2021-01-01 PROCEDURE — 72170 X-RAY EXAM OF PELVIS: CPT | Mod: TC,TXP

## 2021-01-01 PROCEDURE — 63600175 PHARM REV CODE 636 W HCPCS: Mod: TXP | Performed by: NURSE PRACTITIONER

## 2021-01-01 PROCEDURE — 88112 PR  CYTOPATH, CELL ENHANCE TECH: ICD-10-PCS | Mod: 26,TXP,, | Performed by: PATHOLOGY

## 2021-01-01 PROCEDURE — 93016 CARDIAC PET SCAN STRESS (CUPID ONLY): ICD-10-PCS | Mod: TXP,,, | Performed by: INTERNAL MEDICINE

## 2021-01-01 PROCEDURE — 93306 TTE W/DOPPLER COMPLETE: CPT | Mod: TXP

## 2021-01-01 PROCEDURE — 99215 PR OFFICE/OUTPT VISIT, EST, LEVL V, 40-54 MIN: ICD-10-PCS | Mod: S$PBB,TXP,, | Performed by: NURSE PRACTITIONER

## 2021-01-01 PROCEDURE — 99999 PR PBB SHADOW E&M-EST. PATIENT-LVL IV: CPT | Mod: PBBFAC,TXP,, | Performed by: INTERNAL MEDICINE

## 2021-01-01 PROCEDURE — 87116 MYCOBACTERIA CULTURE: CPT | Mod: TXP

## 2021-01-01 PROCEDURE — 93016 CV STRESS TEST SUPVJ ONLY: CPT | Mod: TXP,,, | Performed by: INTERNAL MEDICINE

## 2021-01-01 PROCEDURE — 87070 CULTURE OTHR SPECIMN AEROBIC: CPT | Mod: 59,TXP

## 2021-01-01 PROCEDURE — 93018 CARDIAC PET SCAN STRESS (CUPID ONLY): ICD-10-PCS | Mod: TXP,,, | Performed by: INTERNAL MEDICINE

## 2021-01-01 PROCEDURE — 31624 DX BRONCHOSCOPE/LAVAGE: CPT | Mod: TXP,,, | Performed by: INTERNAL MEDICINE

## 2021-01-01 PROCEDURE — 87102 FUNGUS ISOLATION CULTURE: CPT | Mod: 59,TXP

## 2021-01-01 PROCEDURE — 87118 MYCOBACTERIC IDENTIFICATION: CPT | Mod: 59,TXP | Performed by: INTERNAL MEDICINE

## 2021-01-01 PROCEDURE — 99214 OFFICE O/P EST MOD 30 MIN: CPT | Mod: PBBFAC,NTX | Performed by: INTERNAL MEDICINE

## 2021-01-01 PROCEDURE — 84132 ASSAY OF SERUM POTASSIUM: CPT | Mod: TXP

## 2021-01-01 PROCEDURE — 93306 ECHO (CUPID ONLY): ICD-10-PCS | Mod: 26,TXP,, | Performed by: INTERNAL MEDICINE

## 2021-01-01 PROCEDURE — 87206 SMEAR FLUORESCENT/ACID STAI: CPT | Mod: 91,NTX

## 2021-01-01 PROCEDURE — 88312 PR  SPECIAL STAINS,GROUP I: ICD-10-PCS | Mod: 26,TXP,, | Performed by: PATHOLOGY

## 2021-01-01 PROCEDURE — 87149 DNA/RNA DIRECT PROBE: CPT | Mod: TXP | Performed by: INTERNAL MEDICINE

## 2021-01-01 PROCEDURE — 37000009 HC ANESTHESIA EA ADD 15 MINS: Mod: TXP | Performed by: INTERNAL MEDICINE

## 2021-01-01 PROCEDURE — 76770 US RETROPERITONEAL COMPLETE: ICD-10-PCS | Mod: 26,TXP,, | Performed by: RADIOLOGY

## 2021-01-01 PROCEDURE — 25000003 PHARM REV CODE 250: Mod: NTX | Performed by: NURSE ANESTHETIST, CERTIFIED REGISTERED

## 2021-01-01 PROCEDURE — 99999 PR PBB SHADOW E&M-EST. PATIENT-LVL IV: CPT | Mod: PBBFAC,TXP,, | Performed by: NURSE PRACTITIONER

## 2021-01-01 PROCEDURE — 71250 CT THORAX DX C-: CPT | Mod: 26,NTX,, | Performed by: RADIOLOGY

## 2021-01-01 PROCEDURE — 78434 AQMBF PET REST & RX STRESS: CPT | Mod: 26,TXP,, | Performed by: INTERNAL MEDICINE

## 2021-01-01 PROCEDURE — 99999 PR PBB SHADOW E&M-EST. PATIENT-LVL IV: ICD-10-PCS | Mod: PBBFAC,TXP,, | Performed by: INTERNAL MEDICINE

## 2021-01-01 PROCEDURE — 99215 OFFICE O/P EST HI 40 MIN: CPT | Mod: S$PBB,TXP,, | Performed by: NURSE PRACTITIONER

## 2021-01-01 PROCEDURE — 78434 CARDIAC PET SCAN STRESS (CUPID ONLY): ICD-10-PCS | Mod: 26,TXP,, | Performed by: INTERNAL MEDICINE

## 2021-01-01 PROCEDURE — 99999 PR PBB SHADOW E&M-EST. PATIENT-LVL IV: ICD-10-PCS | Mod: PBBFAC,TXP,, | Performed by: NURSE PRACTITIONER

## 2021-01-01 PROCEDURE — 88112 CYTOPATH CELL ENHANCE TECH: CPT | Mod: NTX | Performed by: PATHOLOGY

## 2021-01-01 PROCEDURE — 87205 SMEAR GRAM STAIN: CPT | Mod: 59,NTX

## 2021-01-01 PROCEDURE — 72170 XR PELVIS ROUTINE AP: ICD-10-PCS | Mod: 26,TXP,, | Performed by: RADIOLOGY

## 2021-01-01 PROCEDURE — 37000008 HC ANESTHESIA 1ST 15 MINUTES: Mod: NTX | Performed by: INTERNAL MEDICINE

## 2021-01-01 PROCEDURE — 99001 SPECIMEN HANDLING PT-LAB: CPT | Mod: PO | Performed by: NURSE PRACTITIONER

## 2021-01-01 PROCEDURE — 88305 TISSUE EXAM BY PATHOLOGIST: ICD-10-PCS | Mod: 26,TXP,, | Performed by: PATHOLOGY

## 2021-01-01 PROCEDURE — 63600175 PHARM REV CODE 636 W HCPCS: Mod: NTX | Performed by: NURSE ANESTHETIST, CERTIFIED REGISTERED

## 2021-01-01 PROCEDURE — 99215 OFFICE O/P EST HI 40 MIN: CPT | Mod: S$PBB,NTX,, | Performed by: INTERNAL MEDICINE

## 2021-01-01 PROCEDURE — 78434 AQMBF PET REST & RX STRESS: CPT | Mod: TXP

## 2021-01-01 PROCEDURE — 87015 SPECIMEN INFECT AGNT CONCNTJ: CPT | Mod: 59,NTX

## 2021-01-01 PROCEDURE — 72170 X-RAY EXAM OF PELVIS: CPT | Mod: 26,TXP,, | Performed by: RADIOLOGY

## 2021-01-01 PROCEDURE — 93306 TTE W/DOPPLER COMPLETE: CPT | Mod: 26,TXP,, | Performed by: INTERNAL MEDICINE

## 2021-01-01 PROCEDURE — 87210 SMEAR WET MOUNT SALINE/INK: CPT | Mod: 91,NTX

## 2021-01-01 PROCEDURE — 71250 CT THORAX DX C-: CPT | Mod: TC,TXP

## 2021-01-01 PROCEDURE — 99215 PR OFFICE/OUTPT VISIT, EST, LEVL V, 40-54 MIN: ICD-10-PCS | Mod: S$PBB,NTX,, | Performed by: INTERNAL MEDICINE

## 2021-01-01 PROCEDURE — 78492 MYOCRD IMG PET MLT RST&STRS: CPT | Mod: 26,TXP,, | Performed by: INTERNAL MEDICINE

## 2021-01-01 PROCEDURE — 88312 SPECIAL STAINS GROUP 1: CPT | Mod: TXP | Performed by: PATHOLOGY

## 2021-01-01 PROCEDURE — 78492 CARDIAC PET SCAN STRESS (CUPID ONLY): ICD-10-PCS | Mod: 26,TXP,, | Performed by: INTERNAL MEDICINE

## 2021-01-01 PROCEDURE — 76770 US EXAM ABDO BACK WALL COMP: CPT | Mod: 26,TXP,, | Performed by: RADIOLOGY

## 2021-01-01 PROCEDURE — 31624 PR BRONCHOSCOPY,DIAG2STIC W LAVAGE: ICD-10-PCS | Mod: TXP,,, | Performed by: INTERNAL MEDICINE

## 2021-01-01 PROCEDURE — 76770 US EXAM ABDO BACK WALL COMP: CPT | Mod: TC,TXP

## 2021-01-01 PROCEDURE — 88305 TISSUE EXAM BY PATHOLOGIST: CPT | Mod: 26,TXP,, | Performed by: PATHOLOGY

## 2021-01-01 PROCEDURE — 88312 SPECIAL STAINS GROUP 1: CPT | Mod: 26,TXP,, | Performed by: PATHOLOGY

## 2021-01-01 PROCEDURE — 99214 OFFICE O/P EST MOD 30 MIN: CPT | Mod: PBBFAC,25,TXP | Performed by: NURSE PRACTITIONER

## 2021-01-01 PROCEDURE — 71250 CT CHEST WITHOUT CONTRAST: ICD-10-PCS | Mod: 26,NTX,, | Performed by: RADIOLOGY

## 2021-01-01 PROCEDURE — 31624 DX BRONCHOSCOPE/LAVAGE: CPT | Mod: RT,NTX | Performed by: INTERNAL MEDICINE

## 2021-01-01 RX ORDER — DIPYRIDAMOLE 5 MG/ML
56.76 INJECTION INTRAVENOUS ONCE
Status: COMPLETED | OUTPATIENT
Start: 2021-01-01 | End: 2021-01-01

## 2021-01-01 RX ORDER — ONDANSETRON 2 MG/ML
INJECTION INTRAMUSCULAR; INTRAVENOUS
Status: DISCONTINUED | OUTPATIENT
Start: 2021-01-01 | End: 2021-01-01

## 2021-01-01 RX ORDER — AMINOPHYLLINE 25 MG/ML
75 INJECTION, SOLUTION INTRAVENOUS ONCE
Status: COMPLETED | OUTPATIENT
Start: 2021-01-01 | End: 2021-01-01

## 2021-01-01 RX ORDER — PROPOFOL 10 MG/ML
VIAL (ML) INTRAVENOUS
Status: DISCONTINUED | OUTPATIENT
Start: 2021-01-01 | End: 2021-01-01

## 2021-01-01 RX ORDER — LIDOCAINE HYDROCHLORIDE 10 MG/ML
INJECTION, SOLUTION EPIDURAL; INFILTRATION; INTRACAUDAL; PERINEURAL
Status: DISCONTINUED | OUTPATIENT
Start: 2021-01-01 | End: 2021-01-01

## 2021-01-01 RX ORDER — FLUTICASONE PROPIONATE 50 MCG
SPRAY, SUSPENSION (ML) NASAL
COMMUNITY
Start: 2021-01-01

## 2021-01-01 RX ADMIN — PROPOFOL 150 MG: 10 INJECTION, EMULSION INTRAVENOUS at 12:02

## 2021-01-01 RX ADMIN — ONDANSETRON 4 MG: 2 INJECTION, SOLUTION INTRAMUSCULAR; INTRAVENOUS at 12:02

## 2021-01-01 RX ADMIN — AMINOPHYLLINE 75 MG: 25 INJECTION, SOLUTION INTRAVENOUS at 09:03

## 2021-01-01 RX ADMIN — DIPYRIDAMOLE 56.75 MG: 5 INJECTION INTRAVENOUS at 09:03

## 2021-01-01 RX ADMIN — LIDOCAINE HYDROCHLORIDE 50 MG: 10 INJECTION, SOLUTION EPIDURAL; INFILTRATION; INTRACAUDAL; PERINEURAL at 12:02

## 2021-02-04 PROBLEM — R91.8 ABNORMAL CT SCAN OF LUNG: Status: ACTIVE | Noted: 2021-01-01

## 2021-04-05 ENCOUNTER — POST MORTEM DOCUMENTATION (OUTPATIENT)
Dept: TRANSPLANT | Facility: CLINIC | Age: 66
End: 2021-04-05

## 2021-04-08 LAB
HLA FREEZE AND HOLD INTERPRETATION: NORMAL
HLAFH COLLECTION DATE: NORMAL
HPRA INTERPRETATION: NORMAL

## 2021-04-09 LAB
ACID FAST MOD KINY STN SPEC: NORMAL
ACID FAST MOD KINY STN SPEC: NORMAL
MYCOBACTERIUM SPEC QL CULT: NORMAL
MYCOBACTERIUM SPEC QL CULT: NORMAL

## 2021-05-03 LAB — ACID FAST SUSCEPTIBILITY, SLOW GROWER: ABNORMAL

## 2021-05-31 LAB
ACID FAST MOD KINY STN SPEC: ABNORMAL
MYCOBACTERIUM SPEC QL CULT: ABNORMAL
MYCOBACTERIUM SPEC QL CULT: ABNORMAL